# Patient Record
Sex: MALE | Race: WHITE | NOT HISPANIC OR LATINO | Employment: OTHER | URBAN - METROPOLITAN AREA
[De-identification: names, ages, dates, MRNs, and addresses within clinical notes are randomized per-mention and may not be internally consistent; named-entity substitution may affect disease eponyms.]

---

## 2017-04-11 ENCOUNTER — RESOLUTE PROFESSIONAL BILLING HOSPITAL PROF FEE (OUTPATIENT)
Dept: HOSPITALIST | Facility: MEDICAL CENTER | Age: 67
End: 2017-04-11

## 2017-04-11 ENCOUNTER — HOSPITAL ENCOUNTER (INPATIENT)
Facility: MEDICAL CENTER | Age: 67
LOS: 2 days | DRG: 312 | End: 2017-04-13
Attending: EMERGENCY MEDICINE | Admitting: INTERNAL MEDICINE
Payer: OTHER MISCELLANEOUS

## 2017-04-11 ENCOUNTER — APPOINTMENT (OUTPATIENT)
Dept: RADIOLOGY | Facility: MEDICAL CENTER | Age: 67
DRG: 312 | End: 2017-04-11
Attending: EMERGENCY MEDICINE
Payer: OTHER MISCELLANEOUS

## 2017-04-11 ENCOUNTER — APPOINTMENT (OUTPATIENT)
Dept: RADIOLOGY | Facility: MEDICAL CENTER | Age: 67
DRG: 312 | End: 2017-04-11
Attending: INTERNAL MEDICINE
Payer: OTHER MISCELLANEOUS

## 2017-04-11 DIAGNOSIS — R55 SYNCOPE, UNSPECIFIED SYNCOPE TYPE: ICD-10-CM

## 2017-04-11 DIAGNOSIS — L08.9 INFECTED DOG BITE: ICD-10-CM

## 2017-04-11 DIAGNOSIS — I48.91 ATRIAL FIBRILLATION, UNSPECIFIED TYPE (HCC): ICD-10-CM
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
## 2017-04-11 LAB
ABO GROUP BLD: NORMAL
ALBUMIN SERPL BCP-MCNC: 3.9 G/DL (ref 3.2–4.9)
ALBUMIN/GLOB SERPL: 1.4 G/DL
ALP SERPL-CCNC: 56 U/L (ref 30–99)
ALT SERPL-CCNC: 22 U/L (ref 2–50)
ANION GAP SERPL CALC-SCNC: 8 MMOL/L (ref 0–11.9)
APTT PPP: 27.3 SEC (ref 24.7–36)
AST SERPL-CCNC: 23 U/L (ref 12–45)
BASOPHILS # BLD AUTO: 0.7 % (ref 0–1.8)
BASOPHILS # BLD: 0.08 K/UL (ref 0–0.12)
BILIRUB SERPL-MCNC: 1.1 MG/DL (ref 0.1–1.5)
BLD GP AB SCN SERPL QL: NORMAL
BUN SERPL-MCNC: 15 MG/DL (ref 8–22)
CALCIUM SERPL-MCNC: 9.1 MG/DL (ref 8.5–10.5)
CHLORIDE SERPL-SCNC: 106 MMOL/L (ref 96–112)
CO2 SERPL-SCNC: 22 MMOL/L (ref 20–33)
CREAT SERPL-MCNC: 1.18 MG/DL (ref 0.5–1.4)
CRP SERPL HS-MCNC: 4.85 MG/DL (ref 0–0.75)
EKG IMPRESSION: NORMAL
EOSINOPHIL # BLD AUTO: 0.01 K/UL (ref 0–0.51)
EOSINOPHIL NFR BLD: 0.1 % (ref 0–6.9)
ERYTHROCYTE [DISTWIDTH] IN BLOOD BY AUTOMATED COUNT: 43.9 FL (ref 35.9–50)
ETHANOL BLD-MCNC: 0 G/DL
GFR SERPL CREATININE-BSD FRML MDRD: >60 ML/MIN/1.73 M 2
GLOBULIN SER CALC-MCNC: 2.8 G/DL (ref 1.9–3.5)
GLUCOSE SERPL-MCNC: 107 MG/DL (ref 65–99)
HCT VFR BLD AUTO: 44.8 % (ref 42–52)
HGB BLD-MCNC: 14.9 G/DL (ref 14–18)
IMM GRANULOCYTES # BLD AUTO: 0.12 K/UL (ref 0–0.11)
IMM GRANULOCYTES NFR BLD AUTO: 1 % (ref 0–0.9)
INR PPP: 1.08 (ref 0.87–1.13)
LYMPHOCYTES # BLD AUTO: 0.62 K/UL (ref 1–4.8)
LYMPHOCYTES NFR BLD: 5.1 % (ref 22–41)
MCH RBC QN AUTO: 30.5 PG (ref 27–33)
MCHC RBC AUTO-ENTMCNC: 33.3 G/DL (ref 33.7–35.3)
MCV RBC AUTO: 91.8 FL (ref 81.4–97.8)
MONOCYTES # BLD AUTO: 0.86 K/UL (ref 0–0.85)
MONOCYTES NFR BLD AUTO: 7 % (ref 0–13.4)
NEUTROPHILS # BLD AUTO: 10.53 K/UL (ref 1.82–7.42)
NEUTROPHILS NFR BLD: 86.1 % (ref 44–72)
NRBC # BLD AUTO: 0 K/UL
NRBC BLD AUTO-RTO: 0 /100 WBC
PLATELET # BLD AUTO: 166 K/UL (ref 164–446)
PMV BLD AUTO: 9.3 FL (ref 9–12.9)
POTASSIUM SERPL-SCNC: 4.5 MMOL/L (ref 3.6–5.5)
PROT SERPL-MCNC: 6.7 G/DL (ref 6–8.2)
PROTHROMBIN TIME: 14.3 SEC (ref 12–14.6)
RBC # BLD AUTO: 4.88 M/UL (ref 4.7–6.1)
RH BLD: NORMAL
SODIUM SERPL-SCNC: 136 MMOL/L (ref 135–145)
TROPONIN I SERPL-MCNC: <0.01 NG/ML (ref 0–0.04)
WBC # BLD AUTO: 12.2 K/UL (ref 4.8–10.8)

## 2017-04-11 PROCEDURE — 80053 COMPREHEN METABOLIC PANEL: CPT

## 2017-04-11 PROCEDURE — A9270 NON-COVERED ITEM OR SERVICE: HCPCS | Performed by: INTERNAL MEDICINE

## 2017-04-11 PROCEDURE — 700117 HCHG RX CONTRAST REV CODE 255: Performed by: EMERGENCY MEDICINE

## 2017-04-11 PROCEDURE — 99285 EMERGENCY DEPT VISIT HI MDM: CPT

## 2017-04-11 PROCEDURE — 700102 HCHG RX REV CODE 250 W/ 637 OVERRIDE(OP): Performed by: INTERNAL MEDICINE

## 2017-04-11 PROCEDURE — 86901 BLOOD TYPING SEROLOGIC RH(D): CPT

## 2017-04-11 PROCEDURE — 86850 RBC ANTIBODY SCREEN: CPT

## 2017-04-11 PROCEDURE — 85730 THROMBOPLASTIN TIME PARTIAL: CPT

## 2017-04-11 PROCEDURE — 99223 1ST HOSP IP/OBS HIGH 75: CPT | Performed by: INTERNAL MEDICINE

## 2017-04-11 PROCEDURE — 770020 HCHG ROOM/CARE - TELE (206)

## 2017-04-11 PROCEDURE — 72125 CT NECK SPINE W/O DYE: CPT

## 2017-04-11 PROCEDURE — 700111 HCHG RX REV CODE 636 W/ 250 OVERRIDE (IP): Performed by: INTERNAL MEDICINE

## 2017-04-11 PROCEDURE — 86140 C-REACTIVE PROTEIN: CPT

## 2017-04-11 PROCEDURE — 85610 PROTHROMBIN TIME: CPT

## 2017-04-11 PROCEDURE — 93005 ELECTROCARDIOGRAM TRACING: CPT | Performed by: EMERGENCY MEDICINE

## 2017-04-11 PROCEDURE — 72128 CT CHEST SPINE W/O DYE: CPT

## 2017-04-11 PROCEDURE — 86900 BLOOD TYPING SEROLOGIC ABO: CPT

## 2017-04-11 PROCEDURE — 700111 HCHG RX REV CODE 636 W/ 250 OVERRIDE (IP): Performed by: EMERGENCY MEDICINE

## 2017-04-11 PROCEDURE — 305948 HCHG GREEN TRAUMA ACT PRE-NOTIFY NO CC

## 2017-04-11 PROCEDURE — 80307 DRUG TEST PRSMV CHEM ANLYZR: CPT

## 2017-04-11 PROCEDURE — 96365 THER/PROPH/DIAG IV INF INIT: CPT

## 2017-04-11 PROCEDURE — 71275 CT ANGIOGRAPHY CHEST: CPT

## 2017-04-11 PROCEDURE — 84484 ASSAY OF TROPONIN QUANT: CPT

## 2017-04-11 PROCEDURE — 700105 HCHG RX REV CODE 258: Performed by: INTERNAL MEDICINE

## 2017-04-11 PROCEDURE — 85025 COMPLETE CBC W/AUTO DIFF WBC: CPT

## 2017-04-11 PROCEDURE — 70450 CT HEAD/BRAIN W/O DYE: CPT

## 2017-04-11 RX ORDER — ONDANSETRON 2 MG/ML
4 INJECTION INTRAMUSCULAR; INTRAVENOUS EVERY 4 HOURS PRN
Status: DISCONTINUED | OUTPATIENT
Start: 2017-04-11 | End: 2017-04-13 | Stop reason: HOSPADM

## 2017-04-11 RX ORDER — ASPIRIN 325 MG
325 TABLET ORAL DAILY
Status: DISCONTINUED | OUTPATIENT
Start: 2017-04-11 | End: 2017-04-13 | Stop reason: HOSPADM

## 2017-04-11 RX ORDER — AMPICILLIN AND SULBACTAM 2; 1 G/1; G/1
3 INJECTION, POWDER, FOR SOLUTION INTRAMUSCULAR; INTRAVENOUS ONCE
Status: COMPLETED | OUTPATIENT
Start: 2017-04-11 | End: 2017-04-11

## 2017-04-11 RX ORDER — LABETALOL HYDROCHLORIDE 5 MG/ML
10 INJECTION, SOLUTION INTRAVENOUS EVERY 4 HOURS PRN
Status: DISCONTINUED | OUTPATIENT
Start: 2017-04-11 | End: 2017-04-13 | Stop reason: HOSPADM

## 2017-04-11 RX ORDER — ONDANSETRON 4 MG/1
4 TABLET, ORALLY DISINTEGRATING ORAL EVERY 4 HOURS PRN
Status: DISCONTINUED | OUTPATIENT
Start: 2017-04-11 | End: 2017-04-13 | Stop reason: HOSPADM

## 2017-04-11 RX ORDER — SODIUM CHLORIDE 9 MG/ML
INJECTION, SOLUTION INTRAVENOUS CONTINUOUS
Status: DISCONTINUED | OUTPATIENT
Start: 2017-04-11 | End: 2017-04-13 | Stop reason: HOSPADM

## 2017-04-11 RX ORDER — HEPARIN SODIUM 5000 [USP'U]/ML
5000 INJECTION, SOLUTION INTRAVENOUS; SUBCUTANEOUS EVERY 8 HOURS
Status: DISCONTINUED | OUTPATIENT
Start: 2017-04-11 | End: 2017-04-13 | Stop reason: HOSPADM

## 2017-04-11 RX ORDER — ASPIRIN 325 MG
650 TABLET ORAL 2 TIMES DAILY PRN
COMMUNITY
End: 2017-12-04

## 2017-04-11 RX ADMIN — IOHEXOL 75 ML: 350 INJECTION, SOLUTION INTRAVENOUS at 20:43

## 2017-04-11 RX ADMIN — HEPARIN SODIUM 5000 UNITS: 5000 INJECTION, SOLUTION INTRAVENOUS; SUBCUTANEOUS at 23:46

## 2017-04-11 RX ADMIN — AMPICILLIN SODIUM AND SULBACTAM SODIUM 3 G: 2; 1 INJECTION, POWDER, FOR SOLUTION INTRAMUSCULAR; INTRAVENOUS at 20:13

## 2017-04-11 RX ADMIN — SODIUM CHLORIDE: 9 INJECTION, SOLUTION INTRAVENOUS at 23:47

## 2017-04-11 RX ADMIN — ASPIRIN 325 MG: 325 TABLET, COATED ORAL at 20:13

## 2017-04-11 ASSESSMENT — LIFESTYLE VARIABLES
EVER_SMOKED: NEVER
EVER_SMOKED: NEVER
ALCOHOL_USE: NO

## 2017-04-11 ASSESSMENT — COPD QUESTIONNAIRES
DURING THE PAST 4 WEEKS HOW MUCH DID YOU FEEL SHORT OF BREATH: NONE/LITTLE OF THE TIME
DO YOU EVER COUGH UP ANY MUCUS OR PHLEGM?: NO/ONLY WITH OCCASIONAL COLDS OR INFECTIONS
DO YOU EVER COUGH UP ANY MUCUS OR PHLEGM?: NO/ONLY WITH OCCASIONAL COLDS OR INFECTIONS
HAVE YOU SMOKED AT LEAST 100 CIGARETTES IN YOUR ENTIRE LIFE: NO/DON'T KNOW
COPD SCREENING SCORE: 2
DURING THE PAST 4 WEEKS HOW MUCH DID YOU FEEL SHORT OF BREATH: NONE/LITTLE OF THE TIME

## 2017-04-11 ASSESSMENT — PAIN SCALES - GENERAL
PAINLEVEL_OUTOF10: 3
PAINLEVEL_OUTOF10: 0

## 2017-04-11 ASSESSMENT — PATIENT HEALTH QUESTIONNAIRE - PHQ9
SUM OF ALL RESPONSES TO PHQ QUESTIONS 1-9: 0
SUM OF ALL RESPONSES TO PHQ9 QUESTIONS 1 AND 2: 0
2. FEELING DOWN, DEPRESSED, IRRITABLE, OR HOPELESS: NOT AT ALL
1. LITTLE INTEREST OR PLEASURE IN DOING THINGS: NOT AT ALL

## 2017-04-11 NOTE — IP AVS SNAPSHOT
" Home Care Instructions                                                                                                                  Name:Abdi Cowart  Medical Record Number:[id number]  CSN: [id number]    YOB: 1950   Age: 67 y.o.  Sex: male  HT:1.803 m (5' 11\") WT: 87.3 kg (192 lb 7.4 oz)          Admit Date: 4/11/2017     Discharge Date:   Today's Date: 4/13/2017  Attending Doctor:  Pb Drake M.D.                  Allergies:  Review of patient's allergies indicates no known allergies.            Discharge Instructions       Discharge Instructions    Discharged to home by car with relative. Discharged via wheelchair, hospital escort: Yes.  Special equipment needed: Not Applicable    Be sure to schedule a follow-up appointment with your primary care doctor or any specialists as instructed.     Discharge Plan:   Diet Plan: Discussed  Activity Level: Discussed  Confirmed Follow up Appointment: Patient to Call and Schedule Appointment  Confirmed Symptoms Management: Discussed  Medication Reconciliation Updated: Yes  Influenza Vaccine Indication: Patient Refuses    I understand that a diet low in cholesterol, fat, and sodium is recommended for good health. Unless I have been given specific instructions below for another diet, I accept this instruction as my diet prescription.   Other diet: None    Special Instructions: None    · Is patient discharged on Warfarin / Coumadin?   No     · Is patient Post Blood Transfusion?  No  Ankle Fracture  A fracture is a break in a bone. The ankle joint is made up of three bones. These include the lower (distal) sections of your lower leg bones, called the tibia and fibula, along with a bone in your foot, called the talus. Depending on how bad the break is and if more than one ankle joint bone is broken, a cast or splint is used to protect and keep your injured bone from moving while it heals. Sometimes, surgery is required to help the fracture heal properly.   "   There are two general types of fractures:  Stable fracture. This includes a single fracture line through one bone, with no injury to ankle ligaments. A fracture of the talus that does not have any displacement (movement of the bone on either side of the fracture line) is also stable.  Unstable fracture. This includes more than one fracture line through one or more bones in the ankle joint. It also includes fractures that have displacement of the bone on either side of the fracture line.  CAUSES  A direct blow to the ankle.    Quickly and severely twisting your ankle.  Trauma, such as a car accident or falling from a significant height.  RISK FACTORS  You may be at a higher risk of ankle fracture if:  You have certain medical conditions.  You are involved in high-impact sports.  You are involved in a high-impact car accident.  SIGNS AND SYMPTOMS   Tender and swollen ankle.  Bruising around the injured ankle.  Pain on movement of the ankle.  Difficulty walking or putting weight on the ankle.  A cold foot below the site of the ankle injury. This can occur if the blood vessels passing through your injured ankle were also damaged.  Numbness in the foot below the site of the ankle injury.  DIAGNOSIS   An ankle fracture is usually diagnosed with a physical exam and X-rays. A CT scan may also be required for complex fractures.  TREATMENT   Stable fractures are treated with a cast or splint and using crutches to avoid putting weight on your injured ankle. This is followed by an ankle strengthening program. Some patients require a special type of cast, depending on other medical problems they may have. Unstable fractures require surgery to ensure the bones heal properly. Your health care provider will tell you what type of fracture you have and the best treatment for your condition.  HOME CARE INSTRUCTIONS   Review correct crutch use with your health care provider and use your crutches as directed. Safe use of crutches is  extremely important. Misuse of crutches can cause you to fall or cause injury to nerves in your hands or armpits.  Do not put weight or pressure on the injured ankle until directed by your health care provider.  To lessen the swelling, keep the injured leg elevated while sitting or lying down.  Apply ice to the injured area:  Put ice in a plastic bag.  Place a towel between your cast and the bag.  Leave the ice on for 20 minutes, 2-3 times a day.  If you have a plaster or fiberglass cast:  Do not try to scratch the skin under the cast with any objects. This can increase your risk of skin infection.  Check the skin around the cast every day. You may put lotion on any red or sore areas.  Keep your cast dry and clean.  If you have a plaster splint:  Wear the splint as directed.  You may loosen the elastic around the splint if your toes become numb, tingle, or turn cold or blue.  Do not put pressure on any part of your cast or splint; it may break. Rest your cast only on a pillow the first 24 hours until it is fully hardened.  Your cast or splint can be protected during bathing with a plastic bag sealed to your skin with medical tape. Do not lower the cast or splint into water.  Take medicines as directed by your health care provider. Only take over-the-counter or prescription medicines for pain, discomfort, or fever as directed by your health care provider.  Do not drive a vehicle until your health care provider specifically tells you it is safe to do so.  If your health care provider has given you a follow-up appointment, it is very important to keep that appointment. Not keeping the appointment could result in a chronic or permanent injury, pain, and disability. If you have any problem keeping the appointment, call the facility for assistance.  SEEK MEDICAL CARE IF:  You develop increased swelling or discomfort.  SEEK IMMEDIATE MEDICAL CARE IF:   Your cast gets damaged or breaks.  You have continued severe pain.  You  develop new pain or swelling after the cast was put on.  Your skin or toenails below the injury turn blue or gray.  Your skin or toenails below the injury feel cold, numb, or have loss of sensitivity to touch.  There is a bad smell or pus draining from under the cast.  MAKE SURE YOU:   Understand these instructions.  Will watch your condition.  Will get help right away if you are not doing well or get worse.     This information is not intended to replace advice given to you by your health care provider. Make sure you discuss any questions you have with your health care provider.     Document Released: 12/15/2001 Document Revised: 12/23/2014 Document Reviewed: 07/17/2014  [employer] Interactive Patient Education ©2016 [employer]      Depression / Suicide Risk    As you are discharged from this [facility] facility, it is important to learn how to keep safe from harming yourself.    Recognize the warning signs:  · Abrupt changes in personality, positive or negative- including increase in energy   · Giving away possessions  · Change in eating patterns- significant weight changes-  positive or negative  · Change in sleeping patterns- unable to sleep or sleeping all the time   · Unwillingness or inability to communicate  · Depression  · Unusual sadness, discouragement and loneliness  · Talk of wanting to die  · Neglect of personal appearance   · Rebelliousness- reckless behavior  · Withdrawal from people/activities they love  · Confusion- inability to concentrate     If you or a loved one observes any of these behaviors or has concerns about self-harm, here's what you can do:  · Talk about it- your feelings and reasons for harming yourself  · Remove any means that you might use to hurt yourself (examples: pills, rope, extension cords, firearm)  · Get professional help from the community (Mental Health, Substance Abuse, psychological counseling)  · Do not be alone:Call your Safe Contact- someone whom you trust who  will be there for you.  · Call your local CRISIS HOTLINE 995-8453 or 450-487-4205  · Call your local Children's Mobile Crisis Response Team [place] (591) 540-1636 or www.[employer]  · Call the toll free National Suicide Prevention Hotlines   · National Suicide Prevention Lifeline 498-968-RDHI (9409)  · [place] [facility] Line Network 800-SUICIDE (994-0168)      Atrial Fibrillation  Atrial fibrillation is a type of irregular heart rhythm (arrhythmia). During atrial fibrillation, the upper chambers of the heart (atria) quiver continuously in a chaotic pattern. This causes an irregular and often rapid heart rate.   Atrial fibrillation is the result of the heart becoming overloaded with disorganized signals that tell it to beat. These signals are normally released one at a time by a part of the right atrium called the sinoatrial node. They then travel from the atria to the lower chambers of the heart (ventricles), causing the atria and ventricles to contract and pump blood as they pass. In atrial fibrillation, parts of the atria outside of the sinoatrial node also release these signals. This results in two problems. First, the atria receive so many signals that they do not have time to fully contract. Second, the ventricles, which can only receive one signal at a time, beat irregularly and out of rhythm with the atria.   There are three types of atrial fibrillation:   · Paroxysmal. Paroxysmal atrial fibrillation starts suddenly and stops on its own within a week.  · Persistent. Persistent atrial fibrillation lasts for more than a week. It may stop on its own or with treatment.  · Permanent. Permanent atrial fibrillation does not go away. Episodes of atrial fibrillation may lead to permanent atrial fibrillation.  Atrial fibrillation can prevent your heart from pumping blood normally. It increases your risk of stroke and can lead to heart failure.   CAUSES   · Heart conditions, including a heart attack, heart  failure, coronary artery disease, and heart valve conditions.    · Inflammation of the sac that surrounds the heart (pericarditis).  · Blockage of an artery in the lungs (pulmonary embolism).  · Pneumonia or other infections.  · Chronic lung disease.  · Thyroid problems, especially if the thyroid is overactive (hyperthyroidism).  · Caffeine, excessive alcohol use, and use of some illegal drugs.    · Use of some medicines, including certain decongestants and diet pills.  · Heart surgery.    · Birth defects.    Sometimes, no cause can be found. When this happens, the atrial fibrillation is called lone atrial fibrillation. The risk of complications from atrial fibrillation increases if you have lone atrial fibrillation and you are age 60 years or older.  RISK FACTORS  · Heart failure.  · Coronary artery disease.  · Diabetes mellitus.    · High blood pressure (hypertension).    · Obesity.    · Other arrhythmias.    · Increased age.  SIGNS AND SYMPTOMS   · A feeling that your heart is beating rapidly or irregularly.    · A feeling of discomfort or pain in your chest.    · Shortness of breath.    · Sudden light-headedness or weakness.    · Getting tired easily when exercising.    · Urinating more often than normal (mainly when atrial fibrillation first begins).    In paroxysmal atrial fibrillation, symptoms may start and suddenly stop.  DIAGNOSIS   Your health care provider may be able to detect atrial fibrillation when taking your pulse. Your health care provider may have you take a test called an ambulatory electrocardiogram (ECG). An ECG records your heartbeat patterns over a 24-hour period. You may also have other tests, such as:  · Transthoracic echocardiogram (TTE). During echocardiography, sound waves are used to evaluate how blood flows through your heart.  · Transesophageal echocardiogram (SAIRA).  · Stress test. There is more than one type of stress test. If a stress test is needed, ask your health care provider  about which type is best for you.  · Chest X-ray exam.  · Blood tests.  · Computed tomography (CT).  TREATMENT   Treatment may include:  · Treating any underlying conditions. For example, if you have an overactive thyroid, treating the condition may correct atrial fibrillation.  · Taking medicine. Medicines may be given to control a rapid heart rate or to prevent blood clots, heart failure, or a stroke.  · Having a procedure to correct the rhythm of the heart:  ¨ Electrical cardioversion. During electrical cardioversion, a controlled, low-energy shock is delivered to the heart through your skin. If you have chest pain, very low blood pressure, or sudden heart failure, this procedure may need to be done as an emergency.  ¨ Catheter ablation. During this procedure, heart tissues that send the signals that cause atrial fibrillation are destroyed.  ¨ Surgical ablation. During this surgery, thin lines of heart tissue that carry the abnormal signals are destroyed. This procedure can either be an open-heart surgery or a minimally invasive surgery. With the minimally invasive surgery, small cuts are made to access the heart instead of a large opening.  ¨ Pulmonary venous isolation. During this surgery, tissue around the veins that carry blood from the lungs (pulmonary veins) is destroyed. This tissue is thought to carry the abnormal signals.  HOME CARE INSTRUCTIONS   · Take medicines only as directed by your health care provider. Some medicines can make atrial fibrillation worse or recur.  · If blood thinners were prescribed by your health care provider, take them exactly as directed. Too much blood-thinning medicine can cause bleeding. If you take too little, you will not have the needed protection against stroke and other problems.  · Perform blood tests at home if directed by your health care provider. Perform blood tests exactly as directed.  · Quit smoking if you smoke.  · Do not drink alcohol.  · Do not drink  caffeinated beverages such as coffee, soda, and some teas. You may drink decaffeinated coffee, soda, or tea.    · Maintain a healthy weight. Do not use diet pills unless your health care provider approves. They may make heart problems worse.    · Follow diet instructions as directed by your health care provider.  · Exercise regularly as directed by your health care provider.  · Keep all follow-up visits as directed by your health care provider. This is important.  PREVENTION   The following substances can cause atrial fibrillation to recur:   · Caffeinated beverages.  · Alcohol.  · Certain medicines, especially those used for breathing problems.  · Certain herbs and herbal medicines, such as those containing ephedra or ginseng.  · Illegal drugs, such as cocaine and amphetamines.  Sometimes medicines are given to prevent atrial fibrillation from recurring. Proper treatment of any underlying condition is also important in helping prevent recurrence.   SEEK MEDICAL CARE IF:  · You notice a change in the rate, rhythm, or strength of your heartbeat.  · You suddenly begin urinating more frequently.  · You tire more easily when exerting yourself or exercising.  SEEK IMMEDIATE MEDICAL CARE IF:   · You have chest pain, abdominal pain, sweating, or weakness.  · You feel nauseous.  · You have shortness of breath.  · You suddenly have swollen feet and ankles.  · You feel dizzy.  · Your face or limbs feel numb or weak.  · You have a change in your vision or speech.  MAKE SURE YOU:   · Understand these instructions.  · Will watch your condition.  · Will get help right away if you are not doing well or get worse.     This information is not intended to replace advice given to you by your health care provider. Make sure you discuss any questions you have with your health care provider.     Document Released: 12/18/2006 Document Revised: 01/08/2016 Document Reviewed: 04/13/2016  Elsevier Interactive Patient Education ©2016 [employer]    Syncope  Syncope is a medical term for fainting or passing out. This means you lose consciousness and drop to the ground. People are generally unconscious for less than 5 minutes. You may have some muscle twitches for up to 15 seconds before waking up and returning to normal. Syncope occurs more often in older adults, but it can happen to anyone. While most causes of syncope are not dangerous, syncope can be a sign of a serious medical problem. It is important to seek medical care.   CAUSES   Syncope is caused by a sudden drop in blood flow to the brain. The specific cause is often not determined. Factors that can bring on syncope include:  · Taking medicines that lower blood pressure.  · Sudden changes in posture, such as standing up quickly.  · Taking more medicine than prescribed.  · Standing in one place for too long.  · Seizure disorders.  · Dehydration and excessive exposure to heat.  · Low blood sugar (hypoglycemia).  · Straining to have a bowel movement.  · Heart disease, irregular heartbeat, or other circulatory problems.  · Fear, emotional distress, seeing blood, or severe pain.  SYMPTOMS   Right before fainting, you may:  · Feel dizzy or light-headed.  · Feel nauseous.  · See all white or all black in your field of vision.  · Have cold, clammy skin.  DIAGNOSIS   Your health care provider will ask about your symptoms, perform a physical exam, and perform an electrocardiogram (ECG) to record the electrical activity of your heart. Your health care provider may also perform other heart or blood tests to determine the cause of your syncope which may include:  · Transthoracic echocardiogram (TTE). During echocardiography, sound waves are used to evaluate how blood flows through your heart.  · Transesophageal echocardiogram (SAIRA).  · Cardiac monitoring. This allows your health care provider to monitor your heart rate and rhythm in real time.  · Holter monitor. This is a portable device that records your  heartbeat and can help diagnose heart arrhythmias. It allows your health care provider to track your heart activity for several days, if needed.  · Stress tests by exercise or by giving medicine that makes the heart beat faster.  TREATMENT   In most cases, no treatment is needed. Depending on the cause of your syncope, your health care provider may recommend changing or stopping some of your medicines.  HOME CARE INSTRUCTIONS  · Have someone stay with you until you feel stable.  · Do not drive, use machinery, or play sports until your health care provider says it is okay.  · Keep all follow-up appointments as directed by your health care provider.  · Lie down right away if you start feeling like you might faint. Breathe deeply and steadily. Wait until all the symptoms have passed.  · Drink enough fluids to keep your urine clear or pale yellow.  · If you are taking blood pressure or heart medicine, get up slowly and take several minutes to sit and then stand. This can reduce dizziness.  SEEK IMMEDIATE MEDICAL CARE IF:   · You have a severe headache.  · You have unusual pain in the chest, abdomen, or back.  · You are bleeding from your mouth or rectum, or you have black or tarry stool.  · You have an irregular or very fast heartbeat.  · You have pain with breathing.  · You have repeated fainting or seizure-like jerking during an episode.  · You faint when sitting or lying down.  · You have confusion.  · You have trouble walking.  · You have severe weakness.  · You have vision problems.  If you fainted, call your local emergency services (911 in [place] Do not drive yourself to the hospital.      This information is not intended to replace advice given to you by your health care provider. Make sure you discuss any questions you have with your health care provider.     Document Released: 12/18/2006 Document Revised: 05/03/2016 Document Reviewed: 02/15/2013  Elsevier Interactive Patient Education ©2016 [employer]  [employer]        Follow-up Information     1. Follow up with Physical Therapy. Schedule an appointment as soon as possible for a visit in 1 week.        2. Please follow up.    Why:  Left Foot Malleolus Avulsion          Discharge Medication Instructions:    Below are the medications your physician expects you to take upon discharge:    Review all your home medications and newly ordered medications with your doctor and/or pharmacist. Follow medication instructions as directed by your doctor and/or pharmacist.    Please keep your medication list with you and share with your physician.               Medication List      START taking these medications        Instructions    Morning Afternoon Evening Bedtime    amoxicillin-clavulanate 875-125 MG Tabs   Commonly known as:  AUGMENTIN        Take 1 Tab by mouth 2 times a day.   Dose:  1 Tab                          CONTINUE taking these medications        Instructions    Morning Afternoon Evening Bedtime    aspirin 325 MG Tabs   Last time this was given:  325 mg on 4/13/2017  7:48 AM   Commonly known as:  ASA        Take 650 mg by mouth 2 times a day as needed.   Dose:  650 mg                        HYDROGEN PEROXIDE EX        1 Application by Apply externally route as needed. Dog bite on foot   Dose:  1 Application                             Where to Get Your Medications      These medications were sent to [facility] - [address] [address]     Phone:  [phone]    - amoxicillin-clavulanate 875-125 MG Tabs            Instructions           Diet / Nutrition:    Follow any diet instructions given to you by your doctor or the dietician, including how much salt (sodium) you are allowed each day.    If you are overweight, talk to your doctor about a weight reduction plan.    Activity:    Remain physically active following your doctor's instructions about exercise and activity.    Rest often.     Any time you become  even a little tired or short of breath, SIT DOWN and rest.    Worsening Symptoms:    Report any of the following signs and symptoms to the doctor's office immediately:    *Pain of jaw, arm, or neck  *Chest pain not relieved by medication                               *Dizziness or loss of consciousness  *Difficulty breathing even when at rest   *More tired than usual                                       *Bleeding drainage or swelling of surgical site  *Swelling of feet, ankles, legs or stomach                 *Fever (>100ºF)  *Pink or blood tinged sputum  *Weight gain (3lbs/day or 5lbs /week)           *Shock from internal defibrillator (if applicable)  *Palpitations or irregular heartbeats                *Cool and/or numb extremities    Stroke Awareness    Common Risk Factors for Stroke include:    Age  Atrial Fibrillation  Carotid Artery Stenosis  Diabetes Mellitus  Excessive alcohol consumption  High blood pressure  Overweight   Physical inactivity  Smoking    Warning signs and symptoms of a stroke include:    *Sudden numbness or weakness of the face, arm or leg (especially on one side of the body).  *Sudden confusion, trouble speaking or understanding.  *Sudden trouble seeing in one or both eyes.  *Sudden trouble walking, dizziness, loss of balance or coordination.Sudden severe headache with no known cause.    It is very important to get treatment quickly when a stroke occurs. If you experience any of the above warning signs, call 911 immediately.                   Disclaimer         Quit Smoking / Tobacco Use:    I understand the use of any tobacco products increases my chance of suffering from future heart disease or stroke and could cause other illnesses which may shorten my life. Quitting the use of tobacco products is the single most important thing I can do to improve my health. For further information on smoking / tobacco cessation call a Toll Free Quit Line at 1-912.199.6909 (*[employer] [place])  or 1-376.392.9288 (American [employer] Association) or you can access the web based program at www.lungusa.org.    [place] Tobacco Users Help Line:  (686) 546-4349       Toll Free: 1-904.782.6931  Quit Tobacco Program [facility] (261)248-3121    Crisis Hotline:    [place] Crisis Hotline:  7-044-XYBMSSM or 1-179.896.8963    [place] Crisis Hotline:    1-342.105.6879 or 720-648-0941    Discharge Survey:   Thank you for choosing [facility]. We hope we did everything we could to make your hospital stay a pleasant one. You may be receiving a phone survey and we would appreciate your time and participation in answering the questions. Your input is very valuable to us in our efforts to improve our service to our patients and their families.        My signature on this form indicates that:    1. I have reviewed and understand the above information.  2. My questions regarding this information have been answered to my satisfaction.  3. I have formulated a plan with my discharge nurse to obtain my prescribed medications for home.                  Disclaimer         __________________________________                     __________       ________                       Patient Signature                                                 Date                    Time

## 2017-04-11 NOTE — IP AVS SNAPSHOT
" <p align=\"LEFT\"><IMG SRC=\"//[email or web address]/blob$/[email or web address]\" alt=\"Image\" WIDTH=\"50%\" HEIGHT=\"200\" BORDER=\"\"></p>                   Name:Abdi Cowart  Medical Record Number:[id number]  CSN: [id number]    YOB: 1950   Age: 67 y.o.  Sex: male  HT:1.803 m (5' 11\") WT: 87.3 kg (192 lb 7.4 oz)          Admit Date: 4/11/2017     Discharge Date:   Today's Date: 4/13/2017  Attending Doctor:  Pb Drake M.D.                  Allergies:  Review of patient's allergies indicates no known allergies.          Follow-up Information     1. Follow up with Physical Therapy. Schedule an appointment as soon as possible for a visit in 1 week.        2. Please follow up.    Why:  Left Foot Malleolus Avulsion          Medication List      Take these Medications        Instructions    amoxicillin-clavulanate 875-125 MG Tabs   Commonly known as:  AUGMENTIN    Take 1 Tab by mouth 2 times a day.   Dose:  1 Tab       aspirin 325 MG Tabs   Commonly known as:  ASA    Take 650 mg by mouth 2 times a day as needed.   Dose:  650 mg       HYDROGEN PEROXIDE EX    1 Application by Apply externally route as needed. Dog bite on foot   Dose:  1 Application         "
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
## 2017-04-11 NOTE — IP AVS SNAPSHOT
[employer] Access Code: [email or web address]  Expires: 5/13/2017 11:38 AM    Your email address is not on file at [facility].  Email Addresses are required for you to sign up for [employer], please contact [phone] to verify your personal information and to provide your email address prior to attempting to register for [employer].    Abdi Cowart  [address] [place] [address] [address]  [place] [address]    [employer]  A secure, online tool to manage your health information     [facility]’s [employer]® is a secure, online tool that connects you to your personalized health information from the privacy of your home -- day or night - making it very easy for you to manage your healthcare. Once the activation process is completed, you can even access your medical information using the [employer] corey, which is available for free in the Apple Corey store or Google Play store.     To learn more about [employer], visit [email or web address]    There are two levels of access available (as shown below):   My Chart Features  [facility] Primary Care Doctor [facility]  Specialists [facility]  Urgent  Care Non-[facility] Primary Care Doctor   Email your healthcare team securely and privately 24/7 X X X    Manage appointments: schedule your next appointment; view details of past/upcoming appointments X      Request prescription refills. X      View recent personal medical records, including lab and immunizations X X X X   View health record, including health history, allergies, medications X X X X   Read reports about your outpatient visits, procedures, consult and ER notes X X X X   See your discharge summary, which is a recap of your hospital and/or ER visit that includes your diagnosis, lab results, and care plan X X  X     How to register for [employer]:  Once your e-mail address has been verified, follow the following steps to sign up for [employer].     1. Go to  [email or web address]  2. Click on the Sign Up Now box, which takes you to the New Member Sign Up  page. You will need to provide the following information:  a. Enter your [employer] Access Code exactly as it appears at the top of this page. (You will not need to use this code after you’ve completed the sign-up process. If you do not sign up before the expiration date, you must request a new code.)   b. Enter your date of birth.   c. Enter your home email address.   d. Click Submit, and follow the next screen’s instructions.  3. Create a [employer] ID. This will be your [employer] login ID and cannot be changed, so think of one that is secure and easy to remember.  4. Create a [employer] password. You can change your password at any time.  5. Enter your Password Reset Question and Answer. This can be used at a later time if you forget your password.   6. Enter your e-mail address. This allows you to receive e-mail notifications when new information is available in [employer].  7. Click Sign Up. You can now view your health information.    For assistance activating your [employer] account, call [phone]

## 2017-04-12 ENCOUNTER — APPOINTMENT (OUTPATIENT)
Dept: RADIOLOGY | Facility: MEDICAL CENTER | Age: 67
DRG: 312 | End: 2017-04-12
Attending: HOSPITALIST
Payer: OTHER MISCELLANEOUS

## 2017-04-12 PROBLEM — L03.039 CELLULITIS, TOE: Status: ACTIVE | Noted: 2017-04-12
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
## 2017-04-12 PROBLEM — S99.919A ANKLE INJURY: Status: ACTIVE | Noted: 2017-04-12

## 2017-04-12 LAB
ABO GROUP BLD: NORMAL
ANION GAP SERPL CALC-SCNC: 6 MMOL/L (ref 0–11.9)
BASOPHILS # BLD AUTO: 0.6 % (ref 0–1.8)
BASOPHILS # BLD: 0.05 K/UL (ref 0–0.12)
BUN SERPL-MCNC: 15 MG/DL (ref 8–22)
CALCIUM SERPL-MCNC: 8.5 MG/DL (ref 8.5–10.5)
CHLORIDE SERPL-SCNC: 106 MMOL/L (ref 96–112)
CO2 SERPL-SCNC: 23 MMOL/L (ref 20–33)
CREAT SERPL-MCNC: 1.12 MG/DL (ref 0.5–1.4)
CRP SERPL HS-MCNC: 9.65 MG/DL (ref 0–0.75)
EOSINOPHIL # BLD AUTO: 0 K/UL (ref 0–0.51)
EOSINOPHIL NFR BLD: 0 % (ref 0–6.9)
ERYTHROCYTE [DISTWIDTH] IN BLOOD BY AUTOMATED COUNT: 43.7 FL (ref 35.9–50)
GFR SERPL CREATININE-BSD FRML MDRD: >60 ML/MIN/1.73 M 2
GLUCOSE SERPL-MCNC: 108 MG/DL (ref 65–99)
HCT VFR BLD AUTO: 40.8 % (ref 42–52)
HGB BLD-MCNC: 13.7 G/DL (ref 14–18)
IMM GRANULOCYTES # BLD AUTO: 0.02 K/UL (ref 0–0.11)
IMM GRANULOCYTES NFR BLD AUTO: 0.2 % (ref 0–0.9)
LV EJECT FRACT  99904: 60
LV EJECT FRACT MOD 2C 99903: 72.62
LV EJECT FRACT MOD 4C 99902: 71.2
LV EJECT FRACT MOD BP 99901: 71.84
LYMPHOCYTES # BLD AUTO: 0.61 K/UL (ref 1–4.8)
LYMPHOCYTES NFR BLD: 7 % (ref 22–41)
MCH RBC QN AUTO: 30.4 PG (ref 27–33)
MCHC RBC AUTO-ENTMCNC: 33.6 G/DL (ref 33.7–35.3)
MCV RBC AUTO: 90.5 FL (ref 81.4–97.8)
MONOCYTES # BLD AUTO: 0.75 K/UL (ref 0–0.85)
MONOCYTES NFR BLD AUTO: 8.6 % (ref 0–13.4)
NEUTROPHILS # BLD AUTO: 7.33 K/UL (ref 1.82–7.42)
NEUTROPHILS NFR BLD: 83.6 % (ref 44–72)
NRBC # BLD AUTO: 0 K/UL
NRBC BLD AUTO-RTO: 0 /100 WBC
PLATELET # BLD AUTO: 162 K/UL (ref 164–446)
PMV BLD AUTO: 9.2 FL (ref 9–12.9)
POTASSIUM SERPL-SCNC: 4.2 MMOL/L (ref 3.6–5.5)
RBC # BLD AUTO: 4.51 M/UL (ref 4.7–6.1)
SODIUM SERPL-SCNC: 135 MMOL/L (ref 135–145)
TSH SERPL DL<=0.005 MIU/L-ACNC: 0.51 UIU/ML (ref 0.3–3.7)
WBC # BLD AUTO: 8.8 K/UL (ref 4.8–10.8)

## 2017-04-12 PROCEDURE — 73610 X-RAY EXAM OF ANKLE: CPT | Mod: LT

## 2017-04-12 PROCEDURE — 36415 COLL VENOUS BLD VENIPUNCTURE: CPT

## 2017-04-12 PROCEDURE — G8979 MOBILITY GOAL STATUS: HCPCS | Mod: CH

## 2017-04-12 PROCEDURE — 770020 HCHG ROOM/CARE - TELE (206)

## 2017-04-12 PROCEDURE — 97535 SELF CARE MNGMENT TRAINING: CPT

## 2017-04-12 PROCEDURE — 700102 HCHG RX REV CODE 250 W/ 637 OVERRIDE(OP): Performed by: INTERNAL MEDICINE

## 2017-04-12 PROCEDURE — 99232 SBSQ HOSP IP/OBS MODERATE 35: CPT | Performed by: HOSPITALIST

## 2017-04-12 PROCEDURE — 700105 HCHG RX REV CODE 258: Performed by: INTERNAL MEDICINE

## 2017-04-12 PROCEDURE — 93880 EXTRACRANIAL BILAT STUDY: CPT

## 2017-04-12 PROCEDURE — 84443 ASSAY THYROID STIM HORMONE: CPT

## 2017-04-12 PROCEDURE — 97165 OT EVAL LOW COMPLEX 30 MIN: CPT

## 2017-04-12 PROCEDURE — A9270 NON-COVERED ITEM OR SERVICE: HCPCS | Performed by: INTERNAL MEDICINE

## 2017-04-12 PROCEDURE — 80048 BASIC METABOLIC PNL TOTAL CA: CPT

## 2017-04-12 PROCEDURE — 93306 TTE W/DOPPLER COMPLETE: CPT

## 2017-04-12 PROCEDURE — 86140 C-REACTIVE PROTEIN: CPT

## 2017-04-12 PROCEDURE — 700111 HCHG RX REV CODE 636 W/ 250 OVERRIDE (IP): Performed by: INTERNAL MEDICINE

## 2017-04-12 PROCEDURE — G8989 SELF CARE D/C STATUS: HCPCS | Mod: CI

## 2017-04-12 PROCEDURE — G8988 SELF CARE GOAL STATUS: HCPCS | Mod: CI

## 2017-04-12 PROCEDURE — 85025 COMPLETE CBC W/AUTO DIFF WBC: CPT

## 2017-04-12 PROCEDURE — 93880 EXTRACRANIAL BILAT STUDY: CPT | Mod: 26 | Performed by: SURGERY

## 2017-04-12 PROCEDURE — 97161 PT EVAL LOW COMPLEX 20 MIN: CPT

## 2017-04-12 PROCEDURE — G8987 SELF CARE CURRENT STATUS: HCPCS | Mod: CI

## 2017-04-12 PROCEDURE — 93306 TTE W/DOPPLER COMPLETE: CPT | Mod: 26 | Performed by: INTERNAL MEDICINE

## 2017-04-12 PROCEDURE — G8978 MOBILITY CURRENT STATUS: HCPCS | Mod: CJ

## 2017-04-12 RX ADMIN — SODIUM CHLORIDE: 9 INJECTION, SOLUTION INTRAVENOUS at 08:34

## 2017-04-12 RX ADMIN — AMPICILLIN SODIUM AND SULBACTAM SODIUM 3 G: 2; 1 INJECTION, POWDER, FOR SOLUTION INTRAMUSCULAR; INTRAVENOUS at 23:10

## 2017-04-12 RX ADMIN — SODIUM CHLORIDE: 9 INJECTION, SOLUTION INTRAVENOUS at 18:13

## 2017-04-12 RX ADMIN — HEPARIN SODIUM 5000 UNITS: 5000 INJECTION, SOLUTION INTRAVENOUS; SUBCUTANEOUS at 21:03

## 2017-04-12 RX ADMIN — HEPARIN SODIUM 5000 UNITS: 5000 INJECTION, SOLUTION INTRAVENOUS; SUBCUTANEOUS at 14:06

## 2017-04-12 RX ADMIN — HEPARIN SODIUM 5000 UNITS: 5000 INJECTION, SOLUTION INTRAVENOUS; SUBCUTANEOUS at 04:52

## 2017-04-12 RX ADMIN — ASPIRIN 325 MG: 325 TABLET, COATED ORAL at 08:28

## 2017-04-12 RX ADMIN — AMPICILLIN SODIUM AND SULBACTAM SODIUM 3 G: 2; 1 INJECTION, POWDER, FOR SOLUTION INTRAMUSCULAR; INTRAVENOUS at 11:16

## 2017-04-12 RX ADMIN — AMPICILLIN SODIUM AND SULBACTAM SODIUM 3 G: 2; 1 INJECTION, POWDER, FOR SOLUTION INTRAMUSCULAR; INTRAVENOUS at 18:12

## 2017-04-12 RX ADMIN — AMPICILLIN SODIUM AND SULBACTAM SODIUM 3 G: 2; 1 INJECTION, POWDER, FOR SOLUTION INTRAMUSCULAR; INTRAVENOUS at 04:52

## 2017-04-12 ASSESSMENT — ENCOUNTER SYMPTOMS
WEIGHT LOSS: 0
PHOTOPHOBIA: 0
DOUBLE VISION: 0
NAUSEA: 0
FEVER: 0
MYALGIAS: 1
HEARTBURN: 0
CHILLS: 0
BLURRED VISION: 0
PALPITATIONS: 0
TREMORS: 0
HEADACHES: 0
HEMOPTYSIS: 0
DIZZINESS: 0
SENSORY CHANGE: 0
VOMITING: 0
TINGLING: 0
DIARRHEA: 0
COUGH: 0

## 2017-04-12 ASSESSMENT — GAIT ASSESSMENTS
GAIT LEVEL OF ASSIST: SUPERVISED
DISTANCE (FEET): 150
ASSISTIVE DEVICE: FRONT WHEEL WALKER;NONE
DEVIATION: ANTALGIC;STEP TO;DECREASED BASE OF SUPPORT;DECREASED HEEL STRIKE;DECREASED TOE OFF

## 2017-04-12 ASSESSMENT — ACTIVITIES OF DAILY LIVING (ADL): TOILETING: INDEPENDENT

## 2017-04-12 ASSESSMENT — PAIN SCALES - GENERAL: PAINLEVEL_OUTOF10: 0

## 2017-04-12 NOTE — ED NOTES
Report from Gerda HCAWLA. Introduced self to pt, pt requesting warm blankets. Provided warm blankets to pt, call light within reach, updated on POC.

## 2017-04-12 NOTE — THERAPY
"Occupational Therapy Evaluation completed.   Functional Status:  Pt seen today for OT evaluation. Pt is at supv for basic self cares and functional mobility/txfs with no AD. Pt edu on appropriate AE/DME for home and compensatory strategies. Pt denies any further deficits in ADLs at this time and will have assistance from his wife if needed.  Plan of Care: Patient with no further skilled OT needs in the acute care setting at this time  Discharge Recommendations:  Equipment: No Equipment Needed. Post-acute therapy Discharge to home with outpatient or home health for additional skilled therapy services.    See \"Rehab Therapy-Acute\" Patient Summary Report for complete documentation.    "

## 2017-04-12 NOTE — PROGRESS NOTES
Hospital Medicine Interval Note  Date of Service:  4/12/2017    Chief Complaint  67 y.o.-year-old male admitted 4/11/2017 with syncope    Right toe cellulitis due o dog bite    Interval Problem Update  Converted to nsr from afib    C/o left ankle pain and swelling, dull ache, worse with movement, intensity 4/10, unsure how it happened    Consultants/Specialty  none    Disposition  home     Review of Systems   Constitutional: Negative for fever, chills and weight loss.   HENT: Negative for hearing loss and tinnitus.    Eyes: Negative for blurred vision, double vision and photophobia.   Respiratory: Negative for cough and hemoptysis.    Cardiovascular: Negative for chest pain and palpitations.   Gastrointestinal: Negative for heartburn, nausea, vomiting and diarrhea.   Genitourinary: Negative for dysuria, urgency, frequency and hematuria.   Musculoskeletal: Positive for myalgias and joint pain.   Neurological: Negative for dizziness, tingling, tremors, sensory change and headaches.   All other systems reviewed and are negative.     Physical Exam Laboratory/Imaging   Filed Vitals:    04/11/17 2115 04/11/17 2125 04/12/17 0000 04/12/17 0400   BP:  145/81 110/69 107/75   Pulse: 85 87 67 89   Temp:  37.2 °C (99 °F) 36.7 °C (98 °F) 36.1 °C (97 °F)   Resp:  18 18 18   Height:       Weight:  87.6 kg (193 lb 2 oz)     SpO2: 93% 94% 95% 98%     Physical Exam   Constitutional: He is oriented to person, place, and time. No distress.   Eyes: Left eye exhibits no discharge. No scleral icterus.   Neck: No tracheal deviation present. No thyromegaly present.   Cardiovascular: Normal rate.  Exam reveals no gallop and no friction rub.    No murmur heard.  Pulmonary/Chest: No respiratory distress. He has no wheezes. He has no rales. He exhibits no tenderness.   Abdominal: Soft. He exhibits no distension. There is no tenderness. There is no rebound and no guarding.   Musculoskeletal: He exhibits edema and tenderness (left ankle).    Neurological: He is alert and oriented to person, place, and time. No cranial nerve deficit. Coordination normal.   Skin: He is not diaphoretic. There is erythema (right second toe).    Lab Results   Component Value Date/Time    WBC 8.8 04/12/2017 03:45 AM    HEMOGLOBIN 13.7* 04/12/2017 03:45 AM    HEMATOCRIT 40.8* 04/12/2017 03:45 AM    PLATELET COUNT 162* 04/12/2017 03:45 AM     Lab Results   Component Value Date/Time    SODIUM 135 04/12/2017 03:45 AM    POTASSIUM 4.2 04/12/2017 03:45 AM    CHLORIDE 106 04/12/2017 03:45 AM    CO2 23 04/12/2017 03:45 AM    GLUCOSE 108* 04/12/2017 03:45 AM    BUN 15 04/12/2017 03:45 AM    CREATININE 1.12 04/12/2017 03:45 AM      Assessment/Plan    Atrial fibrillation ([facility])  Assessment & Plan  Check echo     Check tsh    Syncope  Assessment & Plan  Hydrate for 24 hours    Check carotids    Dog bite  Assessment & Plan  Iv unasyn    Cellulitis, toe  Assessment & Plan  Iv unasyn    Ankle injury  Assessment & Plan  Xray left ankle         Clark catheter: No Clark      DVT Prophylaxis: Enoxaparin (Lovenox)
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
## 2017-04-12 NOTE — PROGRESS NOTES
Report received, pt care assumed, tele box on. VSS, pt assessment complete. Pt aaox 4, no signs of distress noted at this time. POC discussed with pt and verbalizes no questions. Pt c/o of no pain, PRN pain med not administered. Pt denies any additional needs at this time. Bed in lowest position, bed alarm off, pt educated on fall risk and verbalized understanding, call light within reach, will continue to monitor.

## 2017-04-12 NOTE — WOUND TEAM
Wound team consult placed regarding wound to Pt's right second toe.  Pt reports he was bitten by his dog at home several days ago.  The toe looks to be greatly improved from the photo taken on 4/11/17.  Very mild edema and some ecchymosis near the actual bite site which is not draining and is showing signs of healing.  No skilled wound needs at this time, recommend leaving [facility].

## 2017-04-12 NOTE — PROGRESS NOTES
Received report from Bernadette RHOADES RN and assumed care of pt. Pt is A&Ox4. No signs or symptoms of pain or distress. Assessment completed and plan of care discussed. Pt verbalized understanding of call light and communication board. Pt questions answered at this time. Fall precautions in place. Pt needs met at this time. Hourly rounding in place.

## 2017-04-12 NOTE — CARE PLAN
Problem: Safety  Goal: Will remain free from injury  Outcome: PROGRESSING AS EXPECTED  Patient seen by physical therapy, patient is safe to ambulate around room without assistance, PT recommends patient discharged home with walker to aid in easier ambulation with swollen ankle, patient progressing as expected    Problem: Knowledge Deficit  Goal: Knowledge of disease process/condition, treatment plan, diagnostic tests, and medications will improve  Outcome: PROGRESSING AS EXPECTED  Patient educated on medications and disease process, patient verbalizes understanding, no needs at this time

## 2017-04-13 VITALS
BODY MASS INDEX: 26.94 KG/M2 | HEIGHT: 71 IN | SYSTOLIC BLOOD PRESSURE: 134 MMHG | OXYGEN SATURATION: 97 % | DIASTOLIC BLOOD PRESSURE: 88 MMHG | WEIGHT: 192.46 LBS | HEART RATE: 82 BPM | RESPIRATION RATE: 18 BRPM | TEMPERATURE: 98.5 F

## 2017-04-13 PROBLEM — R55 SYNCOPE: Status: RESOLVED | Noted: 2017-04-11 | Resolved: 2017-04-13

## 2017-04-13 PROBLEM — I48.91 ATRIAL FIBRILLATION (HCC): Status: RESOLVED | Noted: 2017-04-11 | Resolved: 2017-04-13

## 2017-04-13 PROCEDURE — 700105 HCHG RX REV CODE 258: Performed by: INTERNAL MEDICINE

## 2017-04-13 PROCEDURE — 99239 HOSP IP/OBS DSCHRG MGMT >30: CPT | Performed by: HOSPITALIST

## 2017-04-13 PROCEDURE — 700102 HCHG RX REV CODE 250 W/ 637 OVERRIDE(OP): Performed by: INTERNAL MEDICINE

## 2017-04-13 PROCEDURE — A9270 NON-COVERED ITEM OR SERVICE: HCPCS | Performed by: INTERNAL MEDICINE

## 2017-04-13 PROCEDURE — 700111 HCHG RX REV CODE 636 W/ 250 OVERRIDE (IP): Performed by: INTERNAL MEDICINE

## 2017-04-13 RX ORDER — AMOXICILLIN AND CLAVULANATE POTASSIUM 875; 125 MG/1; MG/1
1 TABLET, FILM COATED ORAL 2 TIMES DAILY
Qty: 10 TAB | Refills: 0 | Status: SHIPPED | OUTPATIENT
Start: 2017-04-13 | End: 2017-12-04

## 2017-04-13 RX ADMIN — SODIUM CHLORIDE: 9 INJECTION, SOLUTION INTRAVENOUS at 05:39

## 2017-04-13 RX ADMIN — ASPIRIN 325 MG: 325 TABLET, COATED ORAL at 07:48

## 2017-04-13 RX ADMIN — AMPICILLIN SODIUM AND SULBACTAM SODIUM 3 G: 2; 1 INJECTION, POWDER, FOR SOLUTION INTRAMUSCULAR; INTRAVENOUS at 11:36

## 2017-04-13 RX ADMIN — AMPICILLIN SODIUM AND SULBACTAM SODIUM 3 G: 2; 1 INJECTION, POWDER, FOR SOLUTION INTRAMUSCULAR; INTRAVENOUS at 05:49

## 2017-04-13 RX ADMIN — HEPARIN SODIUM 5000 UNITS: 5000 INJECTION, SOLUTION INTRAVENOUS; SUBCUTANEOUS at 05:49

## 2017-04-13 ASSESSMENT — LIFESTYLE VARIABLES
EVER_SMOKED: NEVER
DO YOU DRINK ALCOHOL: NO

## 2017-04-13 ASSESSMENT — COPD QUESTIONNAIRES
DURING THE PAST 4 WEEKS HOW MUCH DID YOU FEEL SHORT OF BREATH: NONE/LITTLE OF THE TIME
DO YOU EVER COUGH UP ANY MUCUS OR PHLEGM?: NO/ONLY WITH OCCASIONAL COLDS OR INFECTIONS
COPD SCREENING SCORE: 2
HAVE YOU SMOKED AT LEAST 100 CIGARETTES IN YOUR ENTIRE LIFE: NO/DON'T KNOW

## 2017-04-13 ASSESSMENT — PAIN SCALES - GENERAL
PAINLEVEL_OUTOF10: 0
PAINLEVEL_OUTOF10: 0

## 2017-04-13 NOTE — CARE PLAN
Problem: Communication  Goal: The ability to communicate needs accurately and effectively will improve  Outcome: PROGRESSING AS EXPECTED  Patient will feel comfortable and confident enough to communicate needs/ceoncerns to staff. Patient asked if he had to accept walker from PT. I informed him he never has to comply with any treatment he is not comfortable with or does not agree with, though of course our staff is trained to always make their best, most patient beneficial professional suggestions. Patient says he understands, says he really wants to go home. Team rounded earlier and said he can go home today but might benefit from a brace or wrap for left ankle and should FU with local PT services. Informed him I would get him discharged as soon as possible. No other concerns.
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
## 2017-04-13 NOTE — CARE PLAN
Problem: Safety  Goal: Will remain free from injury  Outcome: PROGRESSING AS EXPECTED  Patient will remain free from injury by keeping bed at lowest position, socks at all times, call-light within reach at all times, encourage hand hygiene, keep pathways free from clutter. No new injuries to report.

## 2017-04-13 NOTE — PROGRESS NOTES
Discharge education provided at this time. Patient educated on need to make appointment with local PT/OT services for possible cast/wrap for left foot. Patient educated on need to  prescription for Augmentin at listed pharmacy, continue listed home medications as indicated. IV discontinued without complication. Telemetry monitor removed and returned to monitor room. All questions answered. Patient told he is ready to go, call me when ride arrives.

## 2017-04-13 NOTE — PROGRESS NOTES
Patient discharged in route to home at this time. Patient awake, alert, oriented x 4, without signs, symptoms, complaints of SOB or distress. Patient accompanied by two relatives. Patient steady of feet, 95% on room air, refusing escort, wants to walk. All belongings given to patient. All questions answered. Patient pressed to return to ED if symptoms return/worsen.
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
## 2017-12-04 ENCOUNTER — RESOLUTE PROFESSIONAL BILLING HOSPITAL PROF FEE (OUTPATIENT)
Dept: HOSPITALIST | Facility: MEDICAL CENTER | Age: 67
End: 2017-12-04
Payer: COMMERCIAL

## 2017-12-04 ENCOUNTER — HOSPITAL ENCOUNTER (INPATIENT)
Facility: MEDICAL CENTER | Age: 67
LOS: 2 days | DRG: 175 | End: 2017-12-06
Attending: EMERGENCY MEDICINE | Admitting: HOSPITALIST
Payer: MEDICARE

## 2017-12-04 DIAGNOSIS — I26.99 OTHER ACUTE PULMONARY EMBOLISM WITHOUT ACUTE COR PULMONALE (HCC): ICD-10-CM

## 2017-12-04 PROBLEM — J96.01 ACUTE RESPIRATORY FAILURE WITH HYPOXIA (HCC): Status: ACTIVE | Noted: 2017-12-04

## 2017-12-04 LAB
ALBUMIN SERPL BCP-MCNC: 3.8 G/DL (ref 3.2–4.9)
ALBUMIN/GLOB SERPL: 1 G/DL
ALP SERPL-CCNC: 57 U/L (ref 30–99)
ALT SERPL-CCNC: 13 U/L (ref 2–50)
ANION GAP SERPL CALC-SCNC: 10 MMOL/L (ref 0–11.9)
APTT PPP: 35.7 SEC (ref 24.7–36)
AST SERPL-CCNC: 12 U/L (ref 12–45)
BASOPHILS # BLD AUTO: 0.3 % (ref 0–1.8)
BASOPHILS # BLD: 0.04 K/UL (ref 0–0.12)
BILIRUB SERPL-MCNC: 1.1 MG/DL (ref 0.1–1.5)
BNP SERPL-MCNC: 18 PG/ML (ref 0–100)
BUN SERPL-MCNC: 25 MG/DL (ref 8–22)
CALCIUM SERPL-MCNC: 9.5 MG/DL (ref 8.5–10.5)
CHLORIDE SERPL-SCNC: 103 MMOL/L (ref 96–112)
CO2 SERPL-SCNC: 23 MMOL/L (ref 20–33)
CREAT SERPL-MCNC: 1.28 MG/DL (ref 0.5–1.4)
EOSINOPHIL # BLD AUTO: 0 K/UL (ref 0–0.51)
EOSINOPHIL NFR BLD: 0 % (ref 0–6.9)
ERYTHROCYTE [DISTWIDTH] IN BLOOD BY AUTOMATED COUNT: 45.5 FL (ref 35.9–50)
GFR SERPL CREATININE-BSD FRML MDRD: 56 ML/MIN/1.73 M 2
GLOBULIN SER CALC-MCNC: 3.7 G/DL (ref 1.9–3.5)
GLUCOSE SERPL-MCNC: 85 MG/DL (ref 65–99)
HCT VFR BLD AUTO: 45.2 % (ref 42–52)
HGB BLD-MCNC: 15 G/DL (ref 14–18)
IMM GRANULOCYTES # BLD AUTO: 0.05 K/UL (ref 0–0.11)
IMM GRANULOCYTES NFR BLD AUTO: 0.4 % (ref 0–0.9)
INR PPP: 1.35 (ref 0.87–1.13)
LYMPHOCYTES # BLD AUTO: 0.83 K/UL (ref 1–4.8)
LYMPHOCYTES NFR BLD: 7.2 % (ref 22–41)
MCH RBC QN AUTO: 30.5 PG (ref 27–33)
MCHC RBC AUTO-ENTMCNC: 33.2 G/DL (ref 33.7–35.3)
MCV RBC AUTO: 92.1 FL (ref 81.4–97.8)
MONOCYTES # BLD AUTO: 1.56 K/UL (ref 0–0.85)
MONOCYTES NFR BLD AUTO: 13.6 % (ref 0–13.4)
NEUTROPHILS # BLD AUTO: 8.98 K/UL (ref 1.82–7.42)
NEUTROPHILS NFR BLD: 78.5 % (ref 44–72)
NRBC # BLD AUTO: 0 K/UL
NRBC BLD AUTO-RTO: 0 /100 WBC
PLATELET # BLD AUTO: 208 K/UL (ref 164–446)
PMV BLD AUTO: 9.2 FL (ref 9–12.9)
POTASSIUM SERPL-SCNC: 4.1 MMOL/L (ref 3.6–5.5)
PROT SERPL-MCNC: 7.5 G/DL (ref 6–8.2)
PROTHROMBIN TIME: 16.4 SEC (ref 12–14.6)
RBC # BLD AUTO: 4.91 M/UL (ref 4.7–6.1)
SODIUM SERPL-SCNC: 136 MMOL/L (ref 135–145)
TROPONIN I SERPL-MCNC: <0.01 NG/ML (ref 0–0.04)
WBC # BLD AUTO: 11.5 K/UL (ref 4.8–10.8)

## 2017-12-04 PROCEDURE — 93970 EXTREMITY STUDY: CPT

## 2017-12-04 PROCEDURE — 85025 COMPLETE CBC W/AUTO DIFF WBC: CPT

## 2017-12-04 PROCEDURE — A9270 NON-COVERED ITEM OR SERVICE: HCPCS | Performed by: HOSPITALIST

## 2017-12-04 PROCEDURE — 94760 N-INVAS EAR/PLS OXIMETRY 1: CPT

## 2017-12-04 PROCEDURE — 84484 ASSAY OF TROPONIN QUANT: CPT

## 2017-12-04 PROCEDURE — 83880 ASSAY OF NATRIURETIC PEPTIDE: CPT

## 2017-12-04 PROCEDURE — 770006 HCHG ROOM/CARE - MED/SURG/GYN SEMI*

## 2017-12-04 PROCEDURE — 99285 EMERGENCY DEPT VISIT HI MDM: CPT

## 2017-12-04 PROCEDURE — 99223 1ST HOSP IP/OBS HIGH 75: CPT | Performed by: HOSPITALIST

## 2017-12-04 PROCEDURE — 700102 HCHG RX REV CODE 250 W/ 637 OVERRIDE(OP): Performed by: HOSPITALIST

## 2017-12-04 PROCEDURE — 96372 THER/PROPH/DIAG INJ SC/IM: CPT

## 2017-12-04 PROCEDURE — 85610 PROTHROMBIN TIME: CPT

## 2017-12-04 PROCEDURE — 80053 COMPREHEN METABOLIC PANEL: CPT

## 2017-12-04 PROCEDURE — 700111 HCHG RX REV CODE 636 W/ 250 OVERRIDE (IP): Performed by: EMERGENCY MEDICINE

## 2017-12-04 PROCEDURE — 85730 THROMBOPLASTIN TIME PARTIAL: CPT

## 2017-12-04 RX ORDER — OXYCODONE HYDROCHLORIDE 5 MG/1
5 TABLET ORAL
Status: DISCONTINUED | OUTPATIENT
Start: 2017-12-04 | End: 2017-12-06 | Stop reason: HOSPADM

## 2017-12-04 RX ORDER — ONDANSETRON 2 MG/ML
4 INJECTION INTRAMUSCULAR; INTRAVENOUS EVERY 4 HOURS PRN
Status: DISCONTINUED | OUTPATIENT
Start: 2017-12-04 | End: 2017-12-06 | Stop reason: HOSPADM

## 2017-12-04 RX ORDER — AMOXICILLIN 250 MG
2 CAPSULE ORAL 2 TIMES DAILY
Status: DISCONTINUED | OUTPATIENT
Start: 2017-12-04 | End: 2017-12-06 | Stop reason: HOSPADM

## 2017-12-04 RX ORDER — POLYETHYLENE GLYCOL 3350 17 G/17G
1 POWDER, FOR SOLUTION ORAL
Status: DISCONTINUED | OUTPATIENT
Start: 2017-12-04 | End: 2017-12-06 | Stop reason: HOSPADM

## 2017-12-04 RX ORDER — BISACODYL 10 MG
10 SUPPOSITORY, RECTAL RECTAL
Status: DISCONTINUED | OUTPATIENT
Start: 2017-12-04 | End: 2017-12-06 | Stop reason: HOSPADM

## 2017-12-04 RX ORDER — ONDANSETRON 4 MG/1
4 TABLET, ORALLY DISINTEGRATING ORAL EVERY 4 HOURS PRN
Status: DISCONTINUED | OUTPATIENT
Start: 2017-12-04 | End: 2017-12-06 | Stop reason: HOSPADM

## 2017-12-04 RX ORDER — OXYCODONE HYDROCHLORIDE 5 MG/1
2.5 TABLET ORAL
Status: DISCONTINUED | OUTPATIENT
Start: 2017-12-04 | End: 2017-12-06 | Stop reason: HOSPADM

## 2017-12-04 RX ORDER — ACETAMINOPHEN 325 MG/1
650 TABLET ORAL EVERY 6 HOURS PRN
Status: DISCONTINUED | OUTPATIENT
Start: 2017-12-04 | End: 2017-12-06 | Stop reason: HOSPADM

## 2017-12-04 RX ADMIN — ENOXAPARIN SODIUM 80 MG: 100 INJECTION SUBCUTANEOUS at 19:55

## 2017-12-04 RX ADMIN — OXYCODONE HYDROCHLORIDE 5 MG: 5 TABLET ORAL at 22:15

## 2017-12-04 ASSESSMENT — ENCOUNTER SYMPTOMS
COUGH: 1
NAUSEA: 0
SHORTNESS OF BREATH: 1
ABDOMINAL PAIN: 0
FEVER: 0
DIZZINESS: 0
HEMOPTYSIS: 1
VOMITING: 0

## 2017-12-04 ASSESSMENT — COPD QUESTIONNAIRES
HAVE YOU SMOKED AT LEAST 100 CIGARETTES IN YOUR ENTIRE LIFE: NO/DON'T KNOW
COPD SCREENING SCORE: 6
DO YOU EVER COUGH UP ANY MUCUS OR PHLEGM?: NO/ONLY WITH OCCASIONAL COLDS OR INFECTIONS
DURING THE PAST 4 WEEKS HOW MUCH DID YOU FEEL SHORT OF BREATH: MOST  OR ALL OF THE TIME

## 2017-12-04 ASSESSMENT — LIFESTYLE VARIABLES: EVER_SMOKED: UNABLE TO EVALUATE AT THIS TIME - NEEDS ASSESSMENT PRIOR TO DISCHARGE

## 2017-12-05 ENCOUNTER — HOSPITAL ENCOUNTER (OUTPATIENT)
Dept: RADIOLOGY | Facility: MEDICAL CENTER | Age: 67
End: 2017-12-05

## 2017-12-05 PROBLEM — D72.829 LEUKOCYTOSIS: Status: ACTIVE | Noted: 2017-12-05

## 2017-12-05 LAB
BASOPHILS # BLD AUTO: 0.4 % (ref 0–1.8)
BASOPHILS # BLD: 0.04 K/UL (ref 0–0.12)
EOSINOPHIL # BLD AUTO: 0.01 K/UL (ref 0–0.51)
EOSINOPHIL NFR BLD: 0.1 % (ref 0–6.9)
ERYTHROCYTE [DISTWIDTH] IN BLOOD BY AUTOMATED COUNT: 45.5 FL (ref 35.9–50)
HCT VFR BLD AUTO: 43.1 % (ref 42–52)
HGB BLD-MCNC: 14.4 G/DL (ref 14–18)
IMM GRANULOCYTES # BLD AUTO: 0.03 K/UL (ref 0–0.11)
IMM GRANULOCYTES NFR BLD AUTO: 0.3 % (ref 0–0.9)
INR PPP: 1.27 (ref 0.87–1.13)
LYMPHOCYTES # BLD AUTO: 0.72 K/UL (ref 1–4.8)
LYMPHOCYTES NFR BLD: 6.8 % (ref 22–41)
MCH RBC QN AUTO: 30.7 PG (ref 27–33)
MCHC RBC AUTO-ENTMCNC: 33.4 G/DL (ref 33.7–35.3)
MCV RBC AUTO: 91.9 FL (ref 81.4–97.8)
MONOCYTES # BLD AUTO: 1.52 K/UL (ref 0–0.85)
MONOCYTES NFR BLD AUTO: 14.4 % (ref 0–13.4)
NEUTROPHILS # BLD AUTO: 8.22 K/UL (ref 1.82–7.42)
NEUTROPHILS NFR BLD: 78 % (ref 44–72)
NRBC # BLD AUTO: 0 K/UL
NRBC BLD AUTO-RTO: 0 /100 WBC
PLATELET # BLD AUTO: 228 K/UL (ref 164–446)
PMV BLD AUTO: 9.2 FL (ref 9–12.9)
PROTHROMBIN TIME: 15.6 SEC (ref 12–14.6)
RBC # BLD AUTO: 4.69 M/UL (ref 4.7–6.1)
WBC # BLD AUTO: 10.5 K/UL (ref 4.8–10.8)

## 2017-12-05 PROCEDURE — 36415 COLL VENOUS BLD VENIPUNCTURE: CPT

## 2017-12-05 PROCEDURE — A9270 NON-COVERED ITEM OR SERVICE: HCPCS | Performed by: INTERNAL MEDICINE

## 2017-12-05 PROCEDURE — A9270 NON-COVERED ITEM OR SERVICE: HCPCS | Performed by: HOSPITALIST

## 2017-12-05 PROCEDURE — 700102 HCHG RX REV CODE 250 W/ 637 OVERRIDE(OP): Performed by: HOSPITALIST

## 2017-12-05 PROCEDURE — 700111 HCHG RX REV CODE 636 W/ 250 OVERRIDE (IP): Performed by: HOSPITALIST

## 2017-12-05 PROCEDURE — 770006 HCHG ROOM/CARE - MED/SURG/GYN SEMI*

## 2017-12-05 PROCEDURE — 700102 HCHG RX REV CODE 250 W/ 637 OVERRIDE(OP): Performed by: INTERNAL MEDICINE

## 2017-12-05 PROCEDURE — A9270 NON-COVERED ITEM OR SERVICE: HCPCS

## 2017-12-05 PROCEDURE — 85610 PROTHROMBIN TIME: CPT

## 2017-12-05 PROCEDURE — 700102 HCHG RX REV CODE 250 W/ 637 OVERRIDE(OP)

## 2017-12-05 PROCEDURE — 99232 SBSQ HOSP IP/OBS MODERATE 35: CPT | Performed by: INTERNAL MEDICINE

## 2017-12-05 PROCEDURE — 85025 COMPLETE CBC W/AUTO DIFF WBC: CPT

## 2017-12-05 RX ORDER — WARFARIN SODIUM 7.5 MG/1
7.5 TABLET ORAL
Status: DISCONTINUED | OUTPATIENT
Start: 2017-12-05 | End: 2017-12-05

## 2017-12-05 RX ORDER — WARFARIN SODIUM 7.5 MG/1
7.5 TABLET ORAL
Status: COMPLETED | OUTPATIENT
Start: 2017-12-05 | End: 2017-12-05

## 2017-12-05 RX ORDER — OXYCODONE HYDROCHLORIDE 5 MG/1
5 TABLET ORAL
Qty: 15 TAB | Refills: 0 | Status: SHIPPED | OUTPATIENT
Start: 2017-12-05

## 2017-12-05 RX ADMIN — OXYCODONE HYDROCHLORIDE 5 MG: 5 TABLET ORAL at 10:20

## 2017-12-05 RX ADMIN — OXYCODONE HYDROCHLORIDE 5 MG: 5 TABLET ORAL at 01:11

## 2017-12-05 RX ADMIN — ACETAMINOPHEN 650 MG: 325 TABLET, FILM COATED ORAL at 01:11

## 2017-12-05 RX ADMIN — ENOXAPARIN SODIUM 80 MG: 100 INJECTION SUBCUTANEOUS at 09:22

## 2017-12-05 RX ADMIN — STANDARDIZED SENNA CONCENTRATE AND DOCUSATE SODIUM 2 TABLET: 8.6; 5 TABLET, FILM COATED ORAL at 01:00

## 2017-12-05 RX ADMIN — ACETAMINOPHEN 650 MG: 325 TABLET, FILM COATED ORAL at 10:21

## 2017-12-05 RX ADMIN — WARFARIN SODIUM 7.5 MG: 7.5 TABLET ORAL at 02:00

## 2017-12-05 RX ADMIN — RIVAROXABAN 15 MG: 15 TABLET, FILM COATED ORAL at 20:59

## 2017-12-05 ASSESSMENT — ENCOUNTER SYMPTOMS
CHILLS: 0
SHORTNESS OF BREATH: 1
HEARTBURN: 0
HEADACHES: 0
BLOOD IN STOOL: 0
MYALGIAS: 0
VOMITING: 0
DEPRESSION: 0
HALLUCINATIONS: 0
LOSS OF CONSCIOUSNESS: 0
BACK PAIN: 0
PALPITATIONS: 0
DIZZINESS: 0
FEVER: 0
SORE THROAT: 0
DIARRHEA: 0
NAUSEA: 0
ABDOMINAL PAIN: 0
FOCAL WEAKNESS: 0
WEAKNESS: 0
COUGH: 0
BACK PAIN: 1

## 2017-12-05 ASSESSMENT — PATIENT HEALTH QUESTIONNAIRE - PHQ9
8. MOVING OR SPEAKING SO SLOWLY THAT OTHER PEOPLE COULD HAVE NOTICED. OR THE OPPOSITE, BEING SO FIGETY OR RESTLESS THAT YOU HAVE BEEN MOVING AROUND A LOT MORE THAN USUAL: NOT AT ALL
2. FEELING DOWN, DEPRESSED, IRRITABLE, OR HOPELESS: NOT AT ALL
6. FEELING BAD ABOUT YOURSELF - OR THAT YOU ARE A FAILURE OR HAVE LET YOURSELF OR YOUR FAMILY DOWN: NOT AL ALL
4. FEELING TIRED OR HAVING LITTLE ENERGY: NOT AT ALL
SUM OF ALL RESPONSES TO PHQ9 QUESTIONS 1 AND 2: 1
5. POOR APPETITE OR OVEREATING: NOT AT ALL
7. TROUBLE CONCENTRATING ON THINGS, SUCH AS READING THE NEWSPAPER OR WATCHING TELEVISION: NOT AT ALL
SUM OF ALL RESPONSES TO PHQ QUESTIONS 1-9: 1
9. THOUGHTS THAT YOU WOULD BE BETTER OFF DEAD, OR OF HURTING YOURSELF: NOT AT ALL
1. LITTLE INTEREST OR PLEASURE IN DOING THINGS: SEVERAL DAYS
3. TROUBLE FALLING OR STAYING ASLEEP OR SLEEPING TOO MUCH: NOT AT ALL

## 2017-12-05 ASSESSMENT — LIFESTYLE VARIABLES
HAVE PEOPLE ANNOYED YOU BY CRITICIZING YOUR DRINKING: NO
AVERAGE NUMBER OF DAYS PER WEEK YOU HAVE A DRINK CONTAINING ALCOHOL: 1
ON A TYPICAL DAY WHEN YOU DRINK ALCOHOL HOW MANY DRINKS DO YOU HAVE: 1
EVER FELT BAD OR GUILTY ABOUT YOUR DRINKING: NO
TOTAL SCORE: 0
ALCOHOL_USE: YES
TOTAL SCORE: 0
EVER HAD A DRINK FIRST THING IN THE MORNING TO STEADY YOUR NERVES TO GET RID OF A HANGOVER: NO
CONSUMPTION TOTAL: NEGATIVE
HAVE YOU EVER FELT YOU SHOULD CUT DOWN ON YOUR DRINKING: NO
HOW MANY TIMES IN THE PAST YEAR HAVE YOU HAD 5 OR MORE DRINKS IN A DAY: 0
TOTAL SCORE: 0

## 2017-12-05 ASSESSMENT — PAIN SCALES - GENERAL
PAINLEVEL_OUTOF10: 6
PAINLEVEL_OUTOF10: 5
PAINLEVEL_OUTOF10: 9

## 2017-12-05 NOTE — PROGRESS NOTES
Inpatient Anticoagulation Service Note    Date: 12/5/2017  Reason for Anticoagulation: New Pulmonary Embolism        Hemoglobin Value: 15  Hematocrit Value: 45.2  Lab Platelet Value: 208  Target INR: 2.0 to 3.0    INR from last 7 days     Date/Time INR Value    12/04/17 1929 (!)  1.35        Dose from last 7 days     Date/Time Dose (mg)    12/05/17 0100  7.5        Average Dose (mg):  (New start)  Bridge Therapy: Yes (Lovenox 80 mg BID)  Date of Last VTE Event: 12/04/17  Bridge Therapy Start Date: 12/05/17  Days of Overlap Therapy: 0  INR Value Greater than 2 Prior to Discontinuation of Parenteral Anticoagulation: Not Applicable     Comments: New start Coumadin for new PE. Patient with relatively negative past medical history. He does report that he has a distant hx of DVT ~10 years ago while working as a , and patient doesn't recall being on anticoagulation at that time. No home medications, no major drug interactions. Bridging with weight-based Lovenox until INR > 2 x 5 days and two therapeutic labs. Regular diet ordered. Hgb/hct are WNL, no sx of active bleeding.     Plan:  Will dose 7.5 mg x 1, check AM INR.     Education Material Provided?: No    Pharmacist suggested discharge dosing: consider 5 mg daily (with 1 mg/kg q12h Lovenox injections, if bridge not yet completed). Follow up outpatient INR within 72 hours hospital discharge.      Marlo Pina, PharmD
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
## 2017-12-05 NOTE — H&P
DATE OF ADMISSION:  12/04/2017    The patient's PCP is in [place], [facility].    CHIEF COMPLAINT:  Chest pain.    HISTORY OF PRESENT ILLNESS:  Patient is a 67-year-old male who has no   significant past medical history who presented to the hospital in [place]   complaining of right-sided chest pain.  Patient reports that on Friday, he was   unloading a recliner and moving that around.  He thinks that he pulled his   back.  Since that time period, he has been having significant right-sided   chest wall pain.  This has been persistent ever since and then the following   day, he was also short of breath and then began coughing.  He said he was   coughed up some blood at that time, but it was a small amount.  He said the   following day on Sunday, he coughed and it was much larger amount of bloody   sputum and therefore, he decided to go the hospital for evaluation.  Patient   denies any recent travel.  He used to work as a  and had a left   leg DVT over 10 years ago, but he does not think that he was on any   anticoagulation.  He currently complains of some shortness of breath and   right-sided chest wall pain, which is persistent.    REVIEW OF SYSTEMS:  As per HPI.  All other systems have been reviewed and are   negative.    ALLERGIES:  No known drug allergies.    PAST MEDICAL HISTORY:  Personal history of DVT for which he says he does not   think he was treated.  Otherwise, no other medical history.    SOCIAL HISTORY:  Denies tobacco, drugs, or alcohol use.    FAMILY HISTORY:  Has been reviewed and is not pertinent to his current   presentation, negative for any clotting disorders.    HOME MEDICATIONS:  None.    PAST SURGICAL HISTORY:  He had a tonsillectomy and left knee surgery.    PHYSICAL EXAMINATION:  VITAL SIGNS:  Blood pressure is 134/90, pulse of 99, respirations 18,   temperature 37.1, oxygen saturations adequate on 2 L nasal cannula.  Weight 86   kilograms.  GENERAL:  Patient is  pleasant, resting comfortably, not in any acute distress   at this time.  HEENT:  Moist mucous membranes.  No oropharyngeal exudates.  Eyes, EOMI,   PERRLA.  NECK:  No lymphadenopathy, no JVD.  CARDIOVASCULAR:  Regular rate and rhythm.  There are no murmurs.  LUNGS:  Clear to auscultation bilaterally.  No rales or rhonchi.  ABDOMEN:  Positive bowel sounds, soft, nontender, nondistended.  EXTREMITIES:  No clubbing or cyanosis.  NEUROLOGIC:  He is awake, alert, and oriented to person, place, time, and   situation.    LABORATORY DATA:  CBC is unremarkable.  CMP has been reviewed from the other   facility shows a creatinine of 1.3, otherwise unremarkable.    IMAGING:  CAT scan shows multiple right lower lobe PE.    ASSESSMENT AND PLAN:  Patient is a 67-year-old male who was transferred here   from [facility] due to right lower lobe pulmonary emboli.  1.  Pulmonary emboli.  The etiology of this is not clear, but we will   anticoagulate him.  He will be on Lovenox and Coumadin bridge tomorrow.  We   will have social work to see which medications his insurance would approve   such as Xarelto or if he needs to remain on Coumadin and Lovenox bridge.  2.  Acute respiratory failure with hypoxia, currently he is requiring 2 liters   of supplemental oxygen.  His oxygen saturations were approximately 88%   without any supplemental oxygen.  He may need to be discharged home with   oxygen.  We will need to do ambulatory pulse ox.  3.  He is a full code.       ____________________________________     MD JAYSHREE Hernandez / YASMIN    DD:  12/04/2017 19:47:10  DT:  12/04/2017 21:36:33    D#:  [id number]  Job#:  [id number]

## 2017-12-05 NOTE — ED NOTES
Chief Complaint   Patient presents with   • Blood in Sputum     pt states that he took old recliner to the dump on friday, states that he started having a little blood in sputum on sunday, states that more today. pt seen in [place] and told he had multiple PE's. upon arrival to room pt A&O x 4, conversing in full and complete sentences, NAD at this time.

## 2017-12-05 NOTE — PROGRESS NOTES
Julisa Gallego Fall Risk Assessment:     Last Known Fall: No falls  Mobility: No limitations  Medications: No meds  Mental Status/LOC/Awareness: Awake, alert, and oriented to date, place, and person  Toileting Needs: No needs  Volume/Electrolyte Status: No problems  Communication/Sensory: No deficits  Behavior: Appropriate behavior  Julisa Gallego Fall Risk Total: 3  Fall Risk Level: NO RISK    Universal Fall Precautions:  call light/belongings in reach, bed in low position and locked, wheelchairs and assistive devices out of sight, siderails up x 2, use non-slip footwear, adequate lighting, clutter free and spill free environment, educate on level of risk, educate to call for assistance    Fall Risk Level Interventions:          Patient Specific Interventions:     Medication: review medications with patient and family  Mental Status/LOC/Awareness: check on patient hourly and reinforce the use of call light  Toileting: toilet prior to giving pain medications  Volume/Electrolyte Status: monitor abnormal lab values  Communication/Sensory: update plan of care on whiteboard  Behavioral: administer medication as ordered  Mobility: ensure bed is locked and in lowest position
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
## 2017-12-05 NOTE — ED NOTES
Med rec updated and complete.  Allergies reviewed.  Pt denies taking medications.  No prescription  No OTC  No herbal supplements  No antibiotic use in last 30 days.

## 2017-12-05 NOTE — PROGRESS NOTES
"Patient up and ambulating most of the morning until he started hurting stating \"it feels like I broke some ribs\". I gave him some PRN pain medication. No new concerns. Call light within reach.  "

## 2017-12-05 NOTE — RESPIRATORY CARE
COPD EDUCATION by COPD CLINICAL EDUCATOR  12/5/2017 at 6:36 AM by Vonda Dougherty     Patient reviewed by COPD education team. Patient does not qualify for COPD program.

## 2017-12-05 NOTE — CARE PLAN
Problem: Venous Thromboembolism (VTW)/Deep Vein Thrombosis (DVT) Prevention:  Goal: Patient will participate in Venous Thrombosis (VTE)/Deep Vein Thrombosis (DVT)Prevention Measures  Pt is Coumadin-Lovenox bridge for PE in lungs.     Problem: Pain Management  Goal: Pain level will decrease to patient's comfort goal  Medicated pt per MAR for back pain.

## 2017-12-05 NOTE — PROGRESS NOTES
Julisa Gallego Fall Risk Assessment:     Last Known Fall: No falls  Mobility: No limitations  Medications: No meds  Mental Status/LOC/Awareness: Awake, alert, and oriented to date, place, and person  Toileting Needs: No needs  Volume/Electrolyte Status: No problems  Communication/Sensory: No deficits  Behavior: Appropriate behavior  Julisa Gallego Fall Risk Total: 3  Fall Risk Level: NO RISK    Universal Fall Precautions:  call light/belongings in reach, bed in low position and locked, siderails up x 2    Fall Risk Level Interventions:          Patient Specific Interventions:     Medication: review medications with patient and family  Mental Status/LOC/Awareness: not applicable  Toileting: not applicable  Volume/Electrolyte Status: ensure patient remains hydrated  Communication/Sensory: update plan of care on whiteboard and ensure patient has glasses/contacts and hearing aids/dentures  Behavioral: administer medication as ordered  Mobility: ensure bed is locked and in lowest position

## 2017-12-06 ENCOUNTER — PATIENT OUTREACH (OUTPATIENT)
Dept: HEALTH INFORMATION MANAGEMENT | Facility: OTHER | Age: 67
End: 2017-12-06

## 2017-12-06 VITALS
SYSTOLIC BLOOD PRESSURE: 143 MMHG | OXYGEN SATURATION: 94 % | TEMPERATURE: 98.6 F | RESPIRATION RATE: 20 BRPM | HEIGHT: 71 IN | BODY MASS INDEX: 26.6 KG/M2 | HEART RATE: 92 BPM | WEIGHT: 190 LBS | DIASTOLIC BLOOD PRESSURE: 83 MMHG

## 2017-12-06 LAB
ALBUMIN SERPL BCP-MCNC: 3.2 G/DL (ref 3.2–4.9)
BASOPHILS # BLD AUTO: 0.5 % (ref 0–1.8)
BASOPHILS # BLD: 0.04 K/UL (ref 0–0.12)
BUN SERPL-MCNC: 26 MG/DL (ref 8–22)
CALCIUM SERPL-MCNC: 8.8 MG/DL (ref 8.5–10.5)
CHLORIDE SERPL-SCNC: 102 MMOL/L (ref 96–112)
CO2 SERPL-SCNC: 24 MMOL/L (ref 20–33)
CREAT SERPL-MCNC: 1.12 MG/DL (ref 0.5–1.4)
EOSINOPHIL # BLD AUTO: 0.04 K/UL (ref 0–0.51)
EOSINOPHIL NFR BLD: 0.5 % (ref 0–6.9)
ERYTHROCYTE [DISTWIDTH] IN BLOOD BY AUTOMATED COUNT: 44.6 FL (ref 35.9–50)
GFR SERPL CREATININE-BSD FRML MDRD: >60 ML/MIN/1.73 M 2
GLUCOSE SERPL-MCNC: 95 MG/DL (ref 65–99)
HCT VFR BLD AUTO: 39.8 % (ref 42–52)
HGB BLD-MCNC: 13.2 G/DL (ref 14–18)
IMM GRANULOCYTES # BLD AUTO: 0.04 K/UL (ref 0–0.11)
IMM GRANULOCYTES NFR BLD AUTO: 0.5 % (ref 0–0.9)
INR PPP: 2.93 (ref 0.87–1.13)
LYMPHOCYTES # BLD AUTO: 1.21 K/UL (ref 1–4.8)
LYMPHOCYTES NFR BLD: 14.7 % (ref 22–41)
MCH RBC QN AUTO: 30.3 PG (ref 27–33)
MCHC RBC AUTO-ENTMCNC: 33.2 G/DL (ref 33.7–35.3)
MCV RBC AUTO: 91.5 FL (ref 81.4–97.8)
MONOCYTES # BLD AUTO: 1.32 K/UL (ref 0–0.85)
MONOCYTES NFR BLD AUTO: 16 % (ref 0–13.4)
NEUTROPHILS # BLD AUTO: 5.59 K/UL (ref 1.82–7.42)
NEUTROPHILS NFR BLD: 67.8 % (ref 44–72)
NRBC # BLD AUTO: 0 K/UL
NRBC BLD AUTO-RTO: 0 /100 WBC
PHOSPHATE SERPL-MCNC: 2.5 MG/DL (ref 2.5–4.5)
PLATELET # BLD AUTO: 224 K/UL (ref 164–446)
PMV BLD AUTO: 9.5 FL (ref 9–12.9)
POTASSIUM SERPL-SCNC: 3.7 MMOL/L (ref 3.6–5.5)
PROTHROMBIN TIME: 30.3 SEC (ref 12–14.6)
RBC # BLD AUTO: 4.35 M/UL (ref 4.7–6.1)
SODIUM SERPL-SCNC: 134 MMOL/L (ref 135–145)
WBC # BLD AUTO: 8.2 K/UL (ref 4.8–10.8)

## 2017-12-06 PROCEDURE — 80069 RENAL FUNCTION PANEL: CPT

## 2017-12-06 PROCEDURE — 99239 HOSP IP/OBS DSCHRG MGMT >30: CPT | Performed by: INTERNAL MEDICINE

## 2017-12-06 PROCEDURE — 700102 HCHG RX REV CODE 250 W/ 637 OVERRIDE(OP): Performed by: INTERNAL MEDICINE

## 2017-12-06 PROCEDURE — 85610 PROTHROMBIN TIME: CPT

## 2017-12-06 PROCEDURE — A9270 NON-COVERED ITEM OR SERVICE: HCPCS | Performed by: INTERNAL MEDICINE

## 2017-12-06 PROCEDURE — 85025 COMPLETE CBC W/AUTO DIFF WBC: CPT

## 2017-12-06 PROCEDURE — 36415 COLL VENOUS BLD VENIPUNCTURE: CPT

## 2017-12-06 RX ADMIN — RIVAROXABAN 15 MG: 15 TABLET, FILM COATED ORAL at 09:00

## 2017-12-06 ASSESSMENT — LIFESTYLE VARIABLES: EVER_SMOKED: NEVER

## 2017-12-06 ASSESSMENT — PAIN SCALES - GENERAL: PAINLEVEL_OUTOF10: 0

## 2017-12-06 NOTE — PROGRESS NOTES
Assumed patient care at 0700. Received report from night shift. Assessment completed. POC discussed with pt, verbalizes understanding. A&Ox4. Denies pain at this time, No SOB or in any acute distress. Pt is up self, steady gait. Wearing treaded socks. Personal possessions and call light placed within reach. Pt denies any additional needs at this time.

## 2017-12-06 NOTE — PROGRESS NOTES
PT discharged home. PIV DC'ed. Prescriptions given. Discharge instructions given specifically involving PE, and Xarelto. Prescriptions faxed to [facility] pharmacy by PRISCILA atient verbalized understanding, 2nd copy of AVS signed and placed in patient chart.

## 2017-12-06 NOTE — DISCHARGE PLANNING
CHARLEY called [facility] @ [phone]. Confirmed fax number for pharmacy to be [phone]. CHARLEY faxed Xarelto prescriptions to pharmacy .

## 2017-12-06 NOTE — DISCHARGE PLANNING
CHARLEY met with pt at bedside to inquire about pt's pharmacy of choice. [facility]. CHARLEY will call [facility] clinic in the morning 12/6 to send over prescription.

## 2017-12-06 NOTE — PROGRESS NOTES
Rec'd report from day shift RN. Assumed pt care. Assessment completed. AA&OX4. Denies pain at this time. No s/s of discomfort or distress. Pt up self, ambulates to the bathroom and maintains steady gait. Pt continues to report blood tinged sputum, denies blood clots, denies an increase in amount of blood in sputum. Bed in lowest position, bed locked, treaded socks in place, RN and CNA numbers provided, call light within reach.

## 2017-12-06 NOTE — DISCHARGE INSTRUCTIONS
Discharge Instructions    Discharged to home by car with escort. Discharged via walking, hospital escort: Refused.  Special equipment needed: Not Applicable    Be sure to schedule a follow-up appointment with your primary care doctor or any specialists as instructed.     Discharge Plan:   Influenza Vaccine Indication: Patient Refuses    I understand that a diet low in cholesterol, fat, and sodium is recommended for good health. Unless I have been given specific instructions below for another diet, I accept this instruction as my diet prescription.   Other diet: Regular    Special Instructions: None    · Is patient discharged on Warfarin / Coumadin?   No     · Is patient Post Blood Transfusion?  No    Depression / Suicide Risk    As you are discharged from this [facility] facility, it is important to learn how to keep safe from harming yourself.    Recognize the warning signs:  · Abrupt changes in personality, positive or negative- including increase in energy   · Giving away possessions  · Change in eating patterns- significant weight changes-  positive or negative  · Change in sleeping patterns- unable to sleep or sleeping all the time   · Unwillingness or inability to communicate  · Depression  · Unusual sadness, discouragement and loneliness  · Talk of wanting to die  · Neglect of personal appearance   · Rebelliousness- reckless behavior  · Withdrawal from people/activities they love  · Confusion- inability to concentrate     If you or a loved one observes any of these behaviors or has concerns about self-harm, here's what you can do:  · Talk about it- your feelings and reasons for harming yourself  · Remove any means that you might use to hurt yourself (examples: pills, rope, extension cords, firearm)  · Get professional help from the community (Mental Health, Substance Abuse, psychological counseling)  · Do not be alone:Call your Safe Contact- someone whom you trust who will be there for you.  · Call your  local CRISIS HOTLINE 873-1390 or 475-003-9701  · Call your local Children's Mobile Crisis Response Team [place] (427) 184-5470 or www.APROOFED  · Call the toll free National Suicide Prevention Hotlines   · National Suicide Prevention Lifeline 774-456-WJML (2538)  · National [employer] Line Network 800-SUICIDE (737-7834)      Pulmonary Embolism  A pulmonary (lung) embolism (PE) is a blood clot that has traveled to the lung and results in a blockage of blood flow in the affected lung. Most clots come from deep veins in the legs or pelvis. PE is a dangerous and potentially life-threatening condition that can be treated if identified.  CAUSES  Blood clots form in a vein for different reasons. Usually several things cause blood clots. They include:  · The flow of blood slows down.  · The inside of the vein is damaged in some way.  · The person has a condition that makes the blood clot more easily.  RISK FACTORS  Some people are more likely than others to develop PE. Risk factors include:   · Smoking.  · Being overweight (obese).  · Sitting or lying still for a long time. This includes long-distance travel, paralysis, or recovery from an illness or surgery.  Other factors that increase risk are:   · Older age, especially over 75 years of age.  · Having a family history of blood clots or if you have already had a blood clot.  · Having major or lengthy surgery. This is especially true for surgery on the hip, knee, or belly (abdomen). Hip surgery is particularly high risk.  · Having a long, thin tube (catheter) placed inside a vein during a medical procedure.  · Breaking a hip or leg.  · Having cancer or cancer treatment.  · Medicines containing the female hormone estrogen. This includes birth control pills and hormone replacement therapy.  · Other circulation or heart problems.  · Pregnancy and childbirth.  ¨ Hormone changes make the blood clot more easily during pregnancy.  ¨ The fetus puts pressure on the veins of  the pelvis.  ¨ There is a risk of injury to veins during delivery or a caesarean delivery. The risk is highest just after childbirth.    PREVENTION   · Exercise the legs regularly. Take a brisk 30 minute walk every day.  · Maintain a weight that is appropriate for your height.  · Avoid sitting or lying in bed for long periods of time without moving your legs.  · Women, particularly those over the age of 35 years, should consider the risks and benefits of taking estrogen medicines, including birth control pills.  · Do not smoke, especially if you take estrogen medicines.  · Long-distance travel can increase your risk. You should exercise your legs by walking or pumping the muscles every hour.  · Many of the risk factors above relate to situations that exist with hospitalization, either for illness, injury, or elective surgery. Prevention may include medical and nonmedical measures.    ¨ Your health care provider will assess you for the need for venous thromboembolism prevention when you are admitted to the hospital. If you are having surgery, your surgeon will assess you the day of or day after surgery.     SYMPTOMS   The symptoms of a PE usually start suddenly and include:  · Shortness of breath.  · Coughing.  · Coughing up blood or blood-tinged mucus.  · Chest pain. Pain is often worse with deep breaths.  · Rapid heartbeat.  DIAGNOSIS   Your health care provider will take a medical history, perform a physical exam, and use rule-out criteria to assess your risk for PE. If your risk is intermediate or high, other tests may be done. These include:  · Blood tests, such as studies of the clotting properties of your blood.  · Imaging tests, such as ultrasound, CT, MRI, and other tests to see if you have clots in your legs or lungs.  · An electrocardiogram. This can look for heart strain from blood clots in the lungs.  TREATMENT   · The most common treatment for a PE is blood thinning (anticoagulant) medicine, which  reduces the blood's tendency to clot. Anticoagulants can stop new blood clots from forming and old clots from growing. They cannot dissolve existing clots. Your body does this by itself over time. Anticoagulants can be given by mouth, through an intravenous (IV) tube, or by injection. Your health care provider will determine the best program for you.  · Less commonly, clot-dissolving medicines (thrombolytics) are used to dissolve a PE. They carry a high risk of bleeding, so they are used mainly in severe cases.  · Very rarely, a blood clot in the leg needs to be removed surgically.  · If you are unable to take anticoagulants, your health care provider may arrange for you to have a filter placed in a main vein in your abdomen. This filter prevents clots from traveling to your lungs.  HOME CARE INSTRUCTIONS   · Take all medicines as directed by your health care provider.  · Learn as much as you can about DVT.  · Wear a medical alert bracelet or carry a medical alert card.  · Ask your health care provider how soon you can go back to normal activities. It is important to stay active to prevent blood clots. If you are on anticoagulant medicine, avoid contact sports.  · It is very important to exercise. This is especially important while traveling, sitting, or standing for long periods of time. Exercise your legs by walking or by tightening and relaxing your leg muscles regularly. Take frequent walks.  · You may need to wear compression stockings. These are tight elastic stockings that apply pressure to the lower legs. This pressure can help keep the blood in the legs from clotting.  Taking Warfarin  Warfarin is a daily medicine that is taken by mouth. Your health care provider will advise you on the length of treatment (usually 3-6 months, sometimes lifelong). If you take warfarin:  · Understand how to take warfarin and foods that can affect how warfarin works in your body.  · Too much and too little warfarin are both  dangerous. Too much warfarin increases the risk of bleeding. Too little warfarin continues to allow the risk for blood clots.  Warfarin and Regular Blood Testing  While taking warfarin, you will need to have regular blood tests to measure your blood clotting time. These blood tests usually include both the prothrombin time (PT) and international normalized ratio (INR) tests. The PT and INR results allow your health care provider to adjust your dose of warfarin. It is very important that you have your PT and INR tested as often as directed by your health care provider.   Warfarin and Your Diet  Avoid major changes in your diet, or notify your health care provider before changing your diet. Arrange a visit with a registered dietitian to answer your questions. Many foods, especially foods high in vitamin K, can interfere with warfarin and affect the PT and INR results. You should eat a consistent amount of foods high in vitamin K. Foods high in vitamin K include:   · Spinach, kale, broccoli, cabbage, pavan and turnip greens, [place] sprouts, peas, cauliflower, seaweed, and parsley.  · Beef and pork liver.  · Green tea.  · Soybean oil.  Warfarin with Other Medicines  Many medicines can interfere with warfarin and affect the PT and INR results. You must:  · Tell your health care provider about any and all medicines, vitamins, and supplements you take, including aspirin and other over-the-counter anti-inflammatory medicines. Be especially cautious with aspirin and anti-inflammatory medicines. Ask your health care provider before taking these.  · Do not take or discontinue any prescribed or over-the-counter medicine except on the advice of your health care provider or pharmacist.  Warfarin Side Effects  Warfarin can have side effects, such as easy bruising and difficulty stopping bleeding. Ask your health care provider or pharmacist about other side effects of warfarin. You will need to:  · Hold pressure over cuts for  longer than usual.  · Notify your dentist and other health care providers that you are taking warfarin before you undergo any procedures where bleeding may occur.  Warfarin with Alcohol and Tobacco   · Drinking alcohol frequently can increase the effect of warfarin, leading to excess bleeding. It is best to avoid alcoholic drinks or consume only very small amounts while taking warfarin. Notify your health care provider if you change your alcohol intake.  · Do not use any tobacco products including cigarettes, chewing tobacco, or electronic cigarettes. If you smoke, quit. Ask your health care provider for help with quitting smoking.  Alternative Medicines to Warfarin: Factor Xa Inhibitor Medicines  · These blood thinning medicines are taken by mouth, usually for several weeks or longer. It is important to take the medicine every single day, at the same time each day.  · There are no regular blood tests required when using these medicines.  · There are fewer food and drug interactions than with warfarin.  · The side effects of this class of medicine is similar to that of warfarin, including excessive bruising or bleeding. Ask your health care provider or pharmacist about other potential side effects.  SEEK MEDICAL CARE IF:   · You notice a rapid heartbeat.  · You feel weaker or more tired than usual.  · You feel faint.  · You notice increased bruising.  · Your symptoms are not getting better in the time expected.  · You are having side effects of medicine.  SEEK IMMEDIATE MEDICAL CARE IF:   · You have chest pain.  · You have trouble breathing.  · You have new or increased swelling or pain in one leg.  · You cough up blood.  · You notice blood in vomit, in a bowel movement, or in urine.  · You have a fever.  Symptoms of PE may represent a serious problem that is an emergency. Do not wait to see if the symptoms will go away. Get medical help right away. Call your local emergency services (911 in the [place]). Do  not drive yourself to the hospital.       This information is not intended to replace advice given to you by your health care provider. Make sure you discuss any questions you have with your health care provider.     Document Released: 12/15/2001 Document Revised: 01/08/2016 Document Reviewed: 04/13/2016  Recombine Interactive Patient Education ©2016 [employer]    Rivaroxaban oral tablets  What is this medicine?  RIVAROXABAN (ri va REINALDO a ban) is an anticoagulant (blood thinner). It is used to treat blood clots in the lungs or in the veins. It is also used after knee or hip surgeries to prevent blood clots. It is also used to lower the chance of stroke in people with a medical condition called atrial fibrillation.  This medicine may be used for other purposes; ask your health care provider or pharmacist if you have questions.  COMMON BRAND NAME(S): Xarelto  What should I tell my health care provider before I take this medicine?  They need to know if you have any of these conditions:  -bleeding disorders  -bleeding in the brain  -blood in your stools (black or tarry stools) or if you have blood in your vomit  -history of stomach bleeding  -kidney disease  -liver disease  -low blood counts, like low white cell, platelet, or red cell counts  -recent or planned spinal or epidural procedure  -take medicines that treat or prevent blood clots  -an unusual or allergic reaction to rivaroxaban, other medicines, foods, dyes, or preservatives  -pregnant or trying to get pregnant  -breast-feeding  How should I use this medicine?  Take this medicine by mouth with a glass of water. Follow the directions on the prescription label. Take your medicine at regular intervals. Do not take it more often than directed. Do not stop taking except on your doctor's advice.  If you are taking this medicine after hip or knee replacement surgery, take it with or without food.  If you are taking this medicine for atrial fibrillation, take it with  your evening meal. If you are taking this medicine to treat blood clots, take it with food at the same time each day. If you are unable to swallow your tablet, you may crush the tablet and mix it in applesauce. Then, immediately eat the applesauce. You should eat more food right after you eat the applesauce containing the crushed tablet.  Talk to your pediatrician regarding the use of this medicine in children. Special care may be needed.  Overdosage: If you think you've taken too much of this medicine contact a poison control center or emergency room at once.  Overdosage: If you think you have taken too much of this medicine contact a poison control center or emergency room at once.  NOTE: This medicine is only for you. Do not share this medicine with others.  What if I miss a dose?  If you take your medicine once a day and miss a dose, take the missed dose as soon as you remember. If you take your medicine twice a day and miss a dose, take the missed dose immediately. In this instance, 2 tablets may be taken at the same time. The next day you should take 1 tablet twice a day as directed.  What may interact with this medicine?  -aspirin and aspirin-like medicines  -certain antibiotics like erythromycin, azithromycin, and clarithromycin  -certain medicines for fungal infections like ketoconazole and itraconazole  -certain medicines for irregular heart beat like amiodarone, quinidine, dronedarone  -certain medicines for seizures like carbamazepine, phenytoin  -certain medicines that treat or prevent blood clots like warfarin, enoxaparin, and dalteparin   -conivaptan  -diltiazem  -felodipine  -indinavir  -lopinavir; ritonavir  -NSAIDS, medicines for pain and inflammation, like ibuprofen or naproxen  -ranolazine  -rifampin  -ritonavir  -Dibble's wort  -verapamil  This list may not describe all possible interactions. Give your health care provider a list of all the medicines, herbs, non-prescription drugs, or dietary  supplements you use. Also tell them if you smoke, drink alcohol, or use illegal drugs. Some items may interact with your medicine.  What should I watch for while using this medicine?  Do not stop taking this medicine without first talking to your doctor. Stopping this medicine may increase your risk of having a stroke. Be sure to refill your prescription before you run out of medicine.  This medicine may increase your risk to bruise or bleed. Call your doctor or health care professional if you notice any unusual bleeding.  Be careful brushing and flossing your teeth or using a toothpick because you may bleed more easily. If you have any dental work done, tell your dentist you are receiving this medicine.  What side effects may I notice from receiving this medicine?  Side effects that you should report to your doctor or health care professional as soon as possible:  -allergic reactions like skin rash, itching or hives, swelling of the face, lips, or tongue  -back pain  -bloody or black, tarry stools  -changes in vision  -confusion, trouble speaking or understanding  -red or dark-brown urine  -redness, blistering, peeling or loosening of the skin, including inside the mouth  -severe headaches  -spitting up blood or brown material that looks like coffee grounds  -sudden numbness or weakness of the face, arm or leg  -trouble walking, dizziness, loss of balance or coordination  -unusual bruising or bleeding from the eye, gums, or nose   Side effects that usually do not require medical attention (Report these to your doctor or health care professional if they continue or are bothersome.):  -dizziness  -muscle pain  This list may not describe all possible side effects. Call your doctor for medical advice about side effects. You may report side effects to FDA at 8-773-FDA-1347.  Where should I keep my medicine?  Keep out of the reach of children.  Store at room temperature between 15 and 30 degrees C (59 and 86 degrees F).  Throw away any unused medicine after the expiration date.  NOTE: This sheet is a summary. It may not cover all possible information. If you have questions about this medicine, talk to your doctor, pharmacist, or health care provider.  © 2014, Elsevier/[employer]. (3/17/2014 3:32:09 PM)

## 2017-12-06 NOTE — CARE PLAN
Problem: Bowel/Gastric:  Goal: Will not experience complications related to bowel motility    Intervention: Assess baseline bowel pattern  Pt declined stool softener for the evening, reports daily BM.       Problem: Discharge Barriers/Planning  Goal: Patient's continuum of care needs will be met    Intervention: Assess potential discharge barriers on admission and throughout hospital stay  Pt is on PO Xarelto for PE, pending D/C in AM.

## 2017-12-06 NOTE — PROGRESS NOTES
Julisa Gallego Fall Risk Assessment:     Last Known Fall: No falls  Mobility: No limitations  Medications: No meds  Mental Status/LOC/Awareness: Awake, alert, and oriented to date, place, and person  Toileting Needs: No needs  Volume/Electrolyte Status: No problems  Communication/Sensory: No deficits  Behavior: Appropriate behavior  Julisa Gallego Fall Risk Total: 3  Fall Risk Level: NO RISK    Universal Fall Precautions:  call light/belongings in reach, bed in low position and locked, wheelchairs and assistive devices out of sight, siderails up x 2, use non-slip footwear, adequate lighting, clutter free and spill free environment, educate on level of risk, educate to call for assistance    Fall Risk Level Interventions:          Patient Specific Interventions:     Medication: review medications with patient and family  Mental Status/LOC/Awareness: check on patient hourly  Toileting: monitor intake and output/use of appropriate interventions  Volume/Electrolyte Status: monitor abnormal lab values  Communication/Sensory: update plan of care on whiteboard  Behavioral: administer medication as ordered  Mobility: ensure bed is locked and in lowest position

## 2017-12-06 NOTE — PROGRESS NOTES
Rec'd pt via gurney. Escorted by pt transport. Pt ambulated from hallway to bed. Assumed pt care. Assessment completed. AA&OX4. Reports ribcage pain to right side, will medicate per MAR. Pt reports still coughing up some blood, no clots, just blood tinged sputum. Pt is up self, ambulates to the bathroom and maintains steady gait. Oriented pt to [id number], explained call light use, plan of care, visiting hours, smoking policy. Pt verbalized understanding. Bed in lowest position, bed locked, treaded socks in place, RN and CNA numbers provided, call light within reach.

## 2017-12-07 NOTE — DISCHARGE SUMMARY
CHIEF COMPLAINT ON ADMISSION  Chief Complaint   Patient presents with   • Blood in Sputum     pt states that he took old recliner to the dump on friday, states that he started having a little blood in sputum on sunday, states that more today. pt seen in [place] and told he had multiple PE's. upon arrival to room pt A&O x 4, conversing in full and complete sentences, NAD at this time.       CODE STATUS  Full    HPI & HOSPITAL COURSE  This is a 67 y.o. male w remote hx of PE admitted 12/4/2017 with CP and SOB found to have PE.  He had a negative troponin level and initially did have acute respiratory failure with hypoxia requiring oxygen however, this was weaned. He was initially started on Lovenox and Coumadin however due to ease of administration he was changed to Xarelto in order to continue this as an outpatient. [facility] that [facility] would be [facility].  His pain was controlled with Tylenol and he was maintaining his oxygen saturations with ambulation. The possibility of underlying malignancy as a trigger to an unprovoked PE was discussed with the patient. He had no obvious factors, does not smoke and has had no recent immobility or surgery. Also no recent trauma. It was recommended that he undergo age-appropriate screening such as, colonoscopy. He has never had a colonoscopy in the past. He stated very clearly that he has had several close family members who have undergone screening, have found illnesses and spent the rest of their lives and happy, getting treatment that made them miserable. He stated he does not wish to screen for any illnesses however, live each day as it comes.      He does have a primary care physician and will follow up with them in [place].    Therefore, he is discharged in good and stable condition with close outpatient follow-up.    SPECIFIC OUTPATIENT FOLLOW-UP  Advance activity slowly, follow-up with primary care physician within 7 days    DISCHARGE PROBLEM  LIST  Principal Problem:    Pulmonary emboli (CMS-HCC) POA: Unknown  Active Problems:    Acute respiratory failure with hypoxia (CMS-HCC) POA: Unknown    Leukocytosis POA: Unknown  Resolved Problems:    * No resolved hospital problems. *      FOLLOW UP  No future appointments.  [facility]  [address] [place] [id number] [phone]  Call  Please call to schedule your follow up appointment . Thank you.      MEDICATIONS ON DISCHARGE   Abdi Cowart   Home Medication Instructions PABLO:[id number]    Printed on:12/06/17 7342   Medication Information                      oxycodone immediate-release (ROXICODONE) 5 MG Tab  Take 1 Tab by mouth every 3 hours as needed (Severe Pain (NRS Pain Scale 7-10; CPOT Pain Scale 6-8)).             rivaroxaban (XARELTO) 15 MG Tab tablet  Take 1 Tab by mouth 2 Times a Day for 20 days.             rivaroxaban (XARELTO) 20 MG Tab tablet  Take 1 Tab by mouth every day.                 DIET  No orders of the defined types were placed in this encounter.      ACTIVITY  As tolerated.  Weight bearing as tolerated      CONSULTATIONS  None    PROCEDURES  None    LABORATORY  Lab Results   Component Value Date/Time    SODIUM 134 (L) 12/06/2017 01:25 AM    POTASSIUM 3.7 12/06/2017 01:25 AM    CHLORIDE 102 12/06/2017 01:25 AM    CO2 24 12/06/2017 01:25 AM    GLUCOSE 95 12/06/2017 01:25 AM    BUN 26 (H) 12/06/2017 01:25 AM    CREATININE 1.12 12/06/2017 01:25 AM        Lab Results   Component Value Date/Time    WBC 8.2 12/06/2017 01:25 AM    HEMOGLOBIN 13.2 (L) 12/06/2017 01:25 AM    HEMATOCRIT 39.8 (L) 12/06/2017 01:25 AM    PLATELETCT 224 12/06/2017 01:25 AM        Total time of the discharge process exceeds 38 minutes

## 2025-08-25 ENCOUNTER — TELEPHONE (OUTPATIENT)
Dept: UROLOGY | Facility: MEDICAL CENTER | Age: 75
End: 2025-08-25
Payer: COMMERCIAL

## 2025-08-26 ENCOUNTER — HOSPITAL ENCOUNTER (OUTPATIENT)
Dept: RADIOLOGY | Facility: MEDICAL CENTER | Age: 75
End: 2025-08-26

## 2025-08-26 ENCOUNTER — OFFICE VISIT (OUTPATIENT)
Dept: UROLOGY | Facility: MEDICAL CENTER | Age: 75
End: 2025-08-26
Payer: COMMERCIAL

## 2025-08-26 DIAGNOSIS — N28.89 RENAL MASS, RIGHT: Primary | ICD-10-CM

## 2025-08-26 PROCEDURE — 99204 OFFICE O/P NEW MOD 45 MIN: CPT | Performed by: UROLOGY

## 2025-08-27 ENCOUNTER — TELEPHONE (OUTPATIENT)
Dept: UROLOGY | Facility: MEDICAL CENTER | Age: 75
End: 2025-08-27
Payer: COMMERCIAL